# Patient Record
Sex: FEMALE | Race: WHITE | Employment: UNEMPLOYED | URBAN - METROPOLITAN AREA
[De-identification: names, ages, dates, MRNs, and addresses within clinical notes are randomized per-mention and may not be internally consistent; named-entity substitution may affect disease eponyms.]

---

## 2018-01-17 ENCOUNTER — OFFICE VISIT (OUTPATIENT)
Dept: OBGYN CLINIC | Facility: CLINIC | Age: 38
End: 2018-01-17

## 2018-01-17 VITALS
BODY MASS INDEX: 37.32 KG/M2 | DIASTOLIC BLOOD PRESSURE: 76 MMHG | HEIGHT: 61 IN | WEIGHT: 197.69 LBS | SYSTOLIC BLOOD PRESSURE: 130 MMHG

## 2018-01-17 DIAGNOSIS — Z01.419 WOMEN'S ANNUAL ROUTINE GYNECOLOGICAL EXAMINATION: Primary | ICD-10-CM

## 2018-01-17 DIAGNOSIS — N64.4 MASTODYNIA: ICD-10-CM

## 2018-01-17 PROCEDURE — 87624 HPV HI-RISK TYP POOLED RSLT: CPT | Performed by: OBSTETRICS & GYNECOLOGY

## 2018-01-17 PROCEDURE — 99385 PREV VISIT NEW AGE 18-39: CPT | Performed by: OBSTETRICS & GYNECOLOGY

## 2018-01-17 PROCEDURE — 88175 CYTOPATH C/V AUTO FLUID REDO: CPT | Performed by: OBSTETRICS & GYNECOLOGY

## 2018-01-17 RX ORDER — NORETHINDRONE ACETATE AND ETHINYL ESTRADIOL AND FERROUS FUMARATE 1.5-30(21)
KIT ORAL
COMMUNITY
Start: 2017-12-14 | End: 2018-01-17

## 2018-01-17 RX ORDER — CLOBETASOL PROPIONATE 0.5 MG/G
OINTMENT TOPICAL AS NEEDED
COMMUNITY
End: 2019-02-14 | Stop reason: ALTCHOICE

## 2018-01-17 RX ORDER — NORETHINDRONE ACETATE AND ETHINYL ESTRADIOL AND FERROUS FUMARATE 1.5-30(21)
1 KIT ORAL DAILY
Qty: 90 TABLET | Refills: 3 | Status: SHIPPED | OUTPATIENT
Start: 2018-01-17 | End: 2018-12-27

## 2018-01-17 NOTE — PROGRESS NOTES
GYN ANNUAL    1/17/2018  8:51 AM    CC:Patient presents to establish care with concerns about resolved right breast mass 1/2018    HPI: Patient is a 40year old [id number]  here for annual gyn exam and evaluation of right breast. Reports onset two weeks ago o • Prostate Cancer Paternal Grandfather    • Heart Disorder Paternal Grandfather    • Prostate Cancer Paternal Uncle        Social History  Social History   Marital status:   Spouse name: N/A    Years of education: N/A  Number of children: N/A masses  Cul-de-sac: normal  R/V: normal perineum, no hemorrhoids  EXTREMITIES: nontender without edema        A/P: Patient is 40year old female here for well-woman exam.     1. Women's annual routine gynecological examination    2. Mastodynia          1.

## 2018-01-18 LAB — HPV I/H RISK 1 DNA SPEC QL NAA+PROBE: NEGATIVE

## 2018-01-19 ENCOUNTER — HOSPITAL ENCOUNTER (OUTPATIENT)
Dept: MAMMOGRAPHY | Facility: HOSPITAL | Age: 38
Discharge: HOME | End: 2018-01-19
Attending: OBSTETRICS & GYNECOLOGY
Payer: COMMERCIAL

## 2018-01-19 ENCOUNTER — HOSPITAL ENCOUNTER (OUTPATIENT)
Dept: ULTRASOUND IMAGING | Facility: HOSPITAL | Age: 38
Discharge: HOME | End: 2018-01-19
Attending: OBSTETRICS & GYNECOLOGY
Payer: COMMERCIAL

## 2018-01-19 DIAGNOSIS — Z01.419 WOMEN'S ANNUAL ROUTINE GYNECOLOGICAL EXAMINATION: ICD-10-CM

## 2018-01-19 DIAGNOSIS — N64.4 MASTODYNIA: ICD-10-CM

## 2018-01-19 PROCEDURE — 77062 BREAST TOMOSYNTHESIS BI: CPT | Performed by: OBSTETRICS & GYNECOLOGY

## 2018-01-19 PROCEDURE — 76642 ULTRASOUND BREAST LIMITED: CPT | Performed by: OBSTETRICS & GYNECOLOGY

## 2018-01-19 PROCEDURE — 77066 DX MAMMO INCL CAD BI: CPT | Performed by: OBSTETRICS & GYNECOLOGY

## 2018-03-23 ENCOUNTER — HOSPITAL ENCOUNTER (OUTPATIENT)
Age: 38
Discharge: HOME | End: 2018-03-23
Attending: EMERGENCY MEDICINE
Payer: COMMERCIAL

## 2018-03-23 VITALS
TEMPERATURE: 99 F | DIASTOLIC BLOOD PRESSURE: 72 MMHG | SYSTOLIC BLOOD PRESSURE: 136 MMHG | RESPIRATION RATE: 18 BRPM | OXYGEN SATURATION: 100 % | HEART RATE: 88 BPM

## 2018-03-23 DIAGNOSIS — N30.01 ACUTE CYSTITIS WITH HEMATURIA: Primary | ICD-10-CM

## 2018-03-23 LAB
B-HCG UR QL: NEGATIVE
URINE BILIRUBIN: NEGATIVE
URINE CLARITY: CLEAR
URINE COLOR: YELLOW
URINE GLUCOSE: NEGATIVE MG/DL
URINE NITRITE: NEGATIVE
URINE PH: 6
URINE SPECIFIC GRAVITY: 1.02
URINE UROBILINOGEN: 0.2 MG/DL

## 2018-03-23 PROCEDURE — 87077 CULTURE AEROBIC IDENTIFY: CPT | Performed by: EMERGENCY MEDICINE

## 2018-03-23 PROCEDURE — 99203 OFFICE O/P NEW LOW 30 MIN: CPT

## 2018-03-23 PROCEDURE — 87086 URINE CULTURE/COLONY COUNT: CPT | Performed by: EMERGENCY MEDICINE

## 2018-03-23 PROCEDURE — 99214 OFFICE O/P EST MOD 30 MIN: CPT

## 2018-03-23 PROCEDURE — 81025 URINE PREGNANCY TEST: CPT

## 2018-03-23 PROCEDURE — 87186 SC STD MICRODIL/AGAR DIL: CPT | Performed by: EMERGENCY MEDICINE

## 2018-03-23 PROCEDURE — 81002 URINALYSIS NONAUTO W/O SCOPE: CPT

## 2018-03-23 RX ORDER — SULFAMETHOXAZOLE AND TRIMETHOPRIM 800; 160 MG/1; MG/1
1 TABLET ORAL 2 TIMES DAILY
Qty: 6 TABLET | Refills: 0 | Status: SHIPPED | OUTPATIENT
Start: 2018-03-23 | End: 2018-03-26

## 2018-03-23 NOTE — ED INITIAL ASSESSMENT (HPI)
Patient comes in with urinary symptoms, urgency, burning for the past two days. Denies blood in her urine. Denies back pain.

## 2018-03-23 NOTE — ED PROVIDER NOTES
Patient Seen in: [address]    History   Patient presents with:  Urinary Symptoms (urologic)    Stated Complaint: UTI     HPI    Patient is a 60-year-old female with a history of  ×2 presents with complaints of nondistended  Back: No CVA tenderness  Skin: No rash, good turgor      ED Course     Labs Reviewed   [facility] POCT URINALYSIS DIPSTICK MANUAL       ED Course as of Mar 23 1905  ------------------------------------------------------------       [facility]     Patient h

## 2018-04-22 PROBLEM — J98.8 CONGESTION OF UPPER AIRWAY: Status: ACTIVE | Noted: 2018-04-16

## 2018-04-23 ENCOUNTER — OFFICE VISIT (OUTPATIENT)
Dept: FAMILY MEDICINE CLINIC | Facility: CLINIC | Age: 38
End: 2018-04-23

## 2018-04-23 ENCOUNTER — APPOINTMENT (OUTPATIENT)
Dept: LAB | Facility: REFERENCE LAB | Age: 38
End: 2018-04-23
Attending: FAMILY MEDICINE
Payer: COMMERCIAL

## 2018-04-23 VITALS
DIASTOLIC BLOOD PRESSURE: 70 MMHG | BODY MASS INDEX: 36.44 KG/M2 | SYSTOLIC BLOOD PRESSURE: 126 MMHG | OXYGEN SATURATION: 99 % | HEART RATE: 62 BPM | HEIGHT: 61 IN | WEIGHT: 193 LBS

## 2018-04-23 DIAGNOSIS — E66.9 OBESITY (BMI 35.0-39.9 WITHOUT COMORBIDITY): ICD-10-CM

## 2018-04-23 DIAGNOSIS — Z00.00 ENCOUNTER FOR ROUTINE ADULT HEALTH EXAMINATION WITHOUT ABNORMAL FINDINGS: ICD-10-CM

## 2018-04-23 DIAGNOSIS — Z00.00 ENCOUNTER FOR ROUTINE ADULT HEALTH EXAMINATION WITHOUT ABNORMAL FINDINGS: Primary | ICD-10-CM

## 2018-04-23 PROCEDURE — 99385 PREV VISIT NEW AGE 18-39: CPT | Performed by: FAMILY MEDICINE

## 2018-04-23 PROCEDURE — 80061 LIPID PANEL: CPT | Performed by: FAMILY MEDICINE

## 2018-04-23 PROCEDURE — 36415 COLL VENOUS BLD VENIPUNCTURE: CPT | Performed by: FAMILY MEDICINE

## 2018-04-23 PROCEDURE — 83036 HEMOGLOBIN GLYCOSYLATED A1C: CPT | Performed by: FAMILY MEDICINE

## 2018-04-23 RX ORDER — PHENTERMINE HYDROCHLORIDE 15 MG/1
15 CAPSULE ORAL EVERY MORNING
Qty: 30 CAPSULE | Refills: 0 | Status: SHIPPED | OUTPATIENT
Start: 2018-04-23 | End: 2018-05-21

## 2018-04-23 NOTE — PROGRESS NOTES
HPI:   Ernestine Crespo is a 40year old female who presents for a complete physical exam.     Last pap: 1/2018 and normal-repeat 3 years   Last mammogram: 1/2018-6 month diagnostic left breast mammogram and ultrasound recommended    Menses: Regular, month of cervix     in early        Past Surgical History:  05/10/2013:   2015:   2007: D & C      Comment: NOLVIA   2012: FOOT SURGERY Bilateral      Comment: x 2   No date: FAYE      Comment: in early  for abnormal pap   No date: Alexx Mccormick is a 40year old female who presents for a complete physical exam.  Encounter for routine adult health examination without abnormal findings  (primary encounter diagnosis)  Obesity (bmi 35.0-39.9 without comorbidity)    Orders Placed This Encounter Imaging & Consults:  None    Gosia Donovan DO  4/22/2018  7:58 PM

## 2018-04-27 ENCOUNTER — PATIENT MESSAGE (OUTPATIENT)
Dept: FAMILY MEDICINE CLINIC | Facility: CLINIC | Age: 38
End: 2018-04-27

## 2018-04-27 NOTE — TELEPHONE ENCOUNTER
From: Khoi Moody  To: Felix Lopez DO  Sent: 4/27/2018 2:56 PM CDT  Subject: Prescription Question    Seems I'm having random itching with small hives just in the itchy areas. Random shoulder, then later in the day, thigh, then ankle.  I think this m

## 2018-05-21 RX ORDER — TOPIRAMATE 25 MG/1
25 TABLET ORAL NIGHTLY
Qty: 30 TABLET | Refills: 2 | Status: SHIPPED | OUTPATIENT
Start: 2018-05-21 | End: 2018-08-19

## 2018-07-30 ENCOUNTER — TELEPHONE (OUTPATIENT)
Dept: OBGYN CLINIC | Facility: CLINIC | Age: 38
End: 2018-07-30

## 2018-07-30 DIAGNOSIS — N63.0 LUMP OR MASS IN BREAST: Primary | ICD-10-CM

## 2018-07-30 NOTE — TELEPHONE ENCOUNTER
Pt inquiring about f/u mammogram. Per mammo done 1/19/18, pt is due for 6 month f/u diagnostic mammo of left breast and US as well. Orders placed and let pt know.

## 2018-08-06 ENCOUNTER — TELEPHONE (OUTPATIENT)
Dept: OBGYN CLINIC | Facility: CLINIC | Age: 38
End: 2018-08-06

## 2018-08-06 ENCOUNTER — HOSPITAL ENCOUNTER (OUTPATIENT)
Dept: MAMMOGRAPHY | Facility: HOSPITAL | Age: 38
Discharge: HOME | End: 2018-08-06
Attending: OBSTETRICS & GYNECOLOGY
Payer: COMMERCIAL

## 2018-08-06 ENCOUNTER — HOSPITAL ENCOUNTER (OUTPATIENT)
Dept: ULTRASOUND IMAGING | Facility: HOSPITAL | Age: 38
Discharge: HOME | End: 2018-08-06
Attending: OBSTETRICS & GYNECOLOGY
Payer: COMMERCIAL

## 2018-08-06 DIAGNOSIS — N64.4 BREAST PAIN: ICD-10-CM

## 2018-08-06 DIAGNOSIS — N64.4 BREAST PAIN: Primary | ICD-10-CM

## 2018-08-06 DIAGNOSIS — N63.0 LUMP OR MASS IN BREAST: ICD-10-CM

## 2018-08-06 PROCEDURE — 76642 ULTRASOUND BREAST LIMITED: CPT | Performed by: OBSTETRICS & GYNECOLOGY

## 2018-08-06 PROCEDURE — 77062 BREAST TOMOSYNTHESIS BI: CPT | Performed by: OBSTETRICS & GYNECOLOGY

## 2018-08-06 PROCEDURE — 77066 DX MAMMO INCL CAD BI: CPT | Performed by: OBSTETRICS & GYNECOLOGY

## 2018-08-06 NOTE — TELEPHONE ENCOUNTER
Radiology calling requesting a bilateral diagnostic mammogram with usn. Pt c/o bilateral nipple discharge.  Pt in office now

## 2018-08-06 NOTE — TELEPHONE ENCOUNTER
Msg rec'd that pt needs an order for bilat diag mammo with US d/t breast pain. Order placed, radiology notified.

## 2018-08-10 DIAGNOSIS — N63.20 LEFT BREAST MASS: Primary | ICD-10-CM

## 2018-10-08 RX ORDER — NORETHINDRONE ACETATE AND ETHINYL ESTRADIOL 1.5; 3 MG/1; UG/1
TABLET ORAL
Qty: 21 TABLET | Refills: 0 | Status: SHIPPED | OUTPATIENT
Start: 2018-10-08 | End: 2018-12-27

## 2018-12-27 ENCOUNTER — TELEPHONE (OUTPATIENT)
Dept: OBGYN CLINIC | Facility: CLINIC | Age: 38
End: 2018-12-27

## 2018-12-27 RX ORDER — NORETHINDRONE ACETATE AND ETHINYL ESTRADIOL AND FERROUS FUMARATE 1.5-30(21)
1 KIT ORAL DAILY
Qty: 90 TABLET | Refills: 0 | Status: SHIPPED | OUTPATIENT
Start: 2018-12-27 | End: 2019-02-14

## 2018-12-27 NOTE — TELEPHONE ENCOUNTER
Spoke with pt aware needs Diagnostic left mammogram. Pt expressed understanding and will call to schedule appt. Pt also states she nneds refill on birth control, last appointment 01/2018. Annual appt made for 02/14/2019. 2 refills sent to pt's pharmacy.

## 2019-02-14 ENCOUNTER — OFFICE VISIT (OUTPATIENT)
Dept: OBGYN CLINIC | Facility: CLINIC | Age: 39
End: 2019-02-14
Payer: COMMERCIAL

## 2019-02-14 VITALS
SYSTOLIC BLOOD PRESSURE: 130 MMHG | WEIGHT: 196.31 LBS | HEIGHT: 61 IN | DIASTOLIC BLOOD PRESSURE: 86 MMHG | BODY MASS INDEX: 37.06 KG/M2

## 2019-02-14 DIAGNOSIS — N76.3 CHRONIC VULVITIS: ICD-10-CM

## 2019-02-14 DIAGNOSIS — Z01.419 WOMEN'S ANNUAL ROUTINE GYNECOLOGICAL EXAMINATION: Primary | ICD-10-CM

## 2019-02-14 DIAGNOSIS — Z30.41 ENCOUNTER FOR SURVEILLANCE OF CONTRACEPTIVE PILLS: ICD-10-CM

## 2019-02-14 PROCEDURE — 99395 PREV VISIT EST AGE 18-39: CPT | Performed by: OBSTETRICS & GYNECOLOGY

## 2019-02-14 RX ORDER — NORETHINDRONE ACETATE AND ETHINYL ESTRADIOL AND FERROUS FUMARATE 1.5-30(21)
1 KIT ORAL DAILY
Qty: 90 TABLET | Refills: 3 | Status: SHIPPED | OUTPATIENT
Start: 2019-02-14 | End: 2020-02-12

## 2019-02-14 NOTE — PROGRESS NOTES
GYN ANNUAL    2/14/2019  10:35 AM    CC:Patient presents for annual exam    HPI: Patient is a 41year old [id number] LMP 2/5/2019 here for annual gyn exam with multiple concerns.  Reports excessive sweating this summer that has resolved along with persistent v Mother    • Cancer Maternal Grandmother         stomach cancer   • No Known Problems Maternal Grandfather    • Heart Disorder Paternal Grandmother    • Dementia Paternal Grandmother    • Prostate Cancer Paternal Grandfather    • Heart Disorder Paternal Gra leukoplakia perirectally  Urethra: meatus normal  Bladder: well supported  Vagina: normal mucosa, no lesions or discharge   Uterus: normal size, mobile, nontender  Cervix: normal os, no lesions or bleeding  Adnexa: normal size, bilaterally nontender, no pa

## 2019-03-12 RX ORDER — PHENTERMINE HYDROCHLORIDE 15 MG/1
15 CAPSULE ORAL DAILY
COMMUNITY
Start: 2019-03-07 | End: 2019-04-03

## 2019-03-14 ENCOUNTER — HOSPITAL ENCOUNTER (OUTPATIENT)
Dept: MAMMOGRAPHY | Facility: HOSPITAL | Age: 39
Discharge: HOME | End: 2019-03-14
Attending: OBSTETRICS & GYNECOLOGY
Payer: COMMERCIAL

## 2019-03-14 ENCOUNTER — HOSPITAL ENCOUNTER (OUTPATIENT)
Dept: ULTRASOUND IMAGING | Facility: HOSPITAL | Age: 39
Discharge: HOME | End: 2019-03-14
Attending: OBSTETRICS & GYNECOLOGY
Payer: COMMERCIAL

## 2019-03-14 DIAGNOSIS — N63.0 LUMP OR MASS IN BREAST: ICD-10-CM

## 2019-03-14 PROCEDURE — 77065 DX MAMMO INCL CAD UNI: CPT | Performed by: OBSTETRICS & GYNECOLOGY

## 2019-03-14 PROCEDURE — 76642 ULTRASOUND BREAST LIMITED: CPT | Performed by: OBSTETRICS & GYNECOLOGY

## 2019-03-14 PROCEDURE — 77061 BREAST TOMOSYNTHESIS UNI: CPT | Performed by: OBSTETRICS & GYNECOLOGY

## 2019-03-17 ENCOUNTER — OFFICE VISIT (OUTPATIENT)
Dept: URGENT CARE | Age: 39
End: 2019-03-17

## 2019-03-17 VITALS
DIASTOLIC BLOOD PRESSURE: 90 MMHG | SYSTOLIC BLOOD PRESSURE: 150 MMHG | RESPIRATION RATE: 16 BRPM | OXYGEN SATURATION: 99 % | HEART RATE: 103 BPM | TEMPERATURE: 98.7 F

## 2019-03-17 DIAGNOSIS — H66.001 ACUTE SUPPURATIVE OTITIS MEDIA OF RIGHT EAR WITHOUT SPONTANEOUS RUPTURE OF TYMPANIC MEMBRANE, RECURRENCE NOT SPECIFIED: Primary | ICD-10-CM

## 2019-03-17 PROCEDURE — 99202 OFFICE O/P NEW SF 15 MIN: CPT | Performed by: FAMILY MEDICINE

## 2019-03-17 RX ORDER — PHENTERMINE HYDROCHLORIDE 15 MG/1
15 CAPSULE ORAL EVERY MORNING
COMMUNITY

## 2019-03-17 RX ORDER — AMOXICILLIN AND CLAVULANATE POTASSIUM 875; 125 MG/1; MG/1
1 TABLET, FILM COATED ORAL EVERY 12 HOURS
Qty: 20 TABLET | Refills: 0 | Status: SHIPPED | OUTPATIENT
Start: 2019-03-17 | End: 2019-03-27

## 2019-03-17 RX ORDER — NEOMYCIN SULFATE, POLYMYXIN B SULFATE, HYDROCORTISONE 3.5; 10000; 1 MG/ML; [USP'U]/ML; MG/ML
SOLUTION/ DROPS AURICULAR (OTIC)
Qty: 10 ML | Refills: 0 | Status: SHIPPED | OUTPATIENT
Start: 2019-03-17 | End: 2019-03-24

## 2019-04-03 ENCOUNTER — APPOINTMENT (OUTPATIENT)
Dept: LAB | Age: 39
End: 2019-04-03
Attending: FAMILY MEDICINE
Payer: COMMERCIAL

## 2019-04-03 ENCOUNTER — OFFICE VISIT (OUTPATIENT)
Dept: FAMILY MEDICINE CLINIC | Facility: CLINIC | Age: 39
End: 2019-04-03
Payer: COMMERCIAL

## 2019-04-03 VITALS
HEIGHT: 61 IN | DIASTOLIC BLOOD PRESSURE: 72 MMHG | WEIGHT: 195 LBS | BODY MASS INDEX: 36.82 KG/M2 | OXYGEN SATURATION: 98 % | SYSTOLIC BLOOD PRESSURE: 120 MMHG | HEART RATE: 73 BPM

## 2019-04-03 DIAGNOSIS — Z51.81 ENCOUNTER FOR MEDICATION MONITORING: ICD-10-CM

## 2019-04-03 PROCEDURE — 93010 ELECTROCARDIOGRAM REPORT: CPT | Performed by: FAMILY MEDICINE

## 2019-04-03 PROCEDURE — 99213 OFFICE O/P EST LOW 20 MIN: CPT | Performed by: FAMILY MEDICINE

## 2019-04-03 PROCEDURE — 93005 ELECTROCARDIOGRAM TRACING: CPT

## 2019-04-03 RX ORDER — PHENTERMINE HYDROCHLORIDE 30 MG/1
30 CAPSULE ORAL DAILY
Qty: 30 CAPSULE | Refills: 1 | Status: SHIPPED | OUTPATIENT
Start: 2019-04-03 | End: 2019-05-14 | Stop reason: DRUGHIGH

## 2019-04-03 NOTE — PROGRESS NOTES
CC:  Patient presents with:  Medication Follow-Up: regarding phentermine, patient states it was helping with cravings the first 2 weeks she started then the past 2 weeks she feels like its not helping as much      HPI: 41year old female here for follow-up control/protection: OCP    Lifestyle      Physical activity:        Days per week: Not on file        Minutes per session: Not on file      Stress: Not on file    Relationships      Social connections:        Talks on phone: Not on file        Pat vasquez S2  Pulmonary:  Clear bilaterally, good air entry  Dermatologic:  No rashes or lesions    Assessment and Plan: 41year old female here to follow-up after starting Phentermine.      1. BMI 36.0-36.9,adult    - Did note improvement initially with Phentermine

## 2019-04-05 ENCOUNTER — TELEPHONE (OUTPATIENT)
Dept: FAMILY MEDICINE CLINIC | Facility: CLINIC | Age: 39
End: 2019-04-05

## 2019-04-05 NOTE — TELEPHONE ENCOUNTER
Lisa is telling patient she needs a prior auth for  Phentermine HCl 30 MG Oral Cap  She said Lisa told her they sent us this information  Please contact Lisa in [phone]

## 2019-04-10 NOTE — TELEPHONE ENCOUNTER
Pt called upset that she \"keeps getting different messages from different people\". Pt is waiting for her prior authorization for a medication she requested on 4/5.   This RN checked in with MG who was log in on to cover my meds to check on the status of t

## 2019-04-10 NOTE — TELEPHONE ENCOUNTER
Prior authorization has been processed will await insurance response and notify patient once we receive a response

## 2019-04-11 NOTE — TELEPHONE ENCOUNTER
Called patient and informed her that we received a letter that the insurance is not able to verify her insurance. Patient confirmed that we do have the right insurance information.  Called [employer] and they also can not find the patient's member ID or patient i

## 2019-04-12 NOTE — TELEPHONE ENCOUNTER
Spoke to patient and informed her that I was able to get approval for her prior authorization.  Patient verbalized understanding

## 2019-04-18 ENCOUNTER — OFFICE VISIT (OUTPATIENT)
Dept: FAMILY MEDICINE CLINIC | Facility: CLINIC | Age: 39
End: 2019-04-18
Payer: COMMERCIAL

## 2019-04-18 VITALS — DIASTOLIC BLOOD PRESSURE: 83 MMHG | SYSTOLIC BLOOD PRESSURE: 138 MMHG | TEMPERATURE: 99 F

## 2019-04-18 DIAGNOSIS — H92.02 OTALGIA OF LEFT EAR: ICD-10-CM

## 2019-04-18 DIAGNOSIS — H93.8X2 IRRITATION OF LEFT EAR: Primary | ICD-10-CM

## 2019-04-18 PROCEDURE — 99202 OFFICE O/P NEW SF 15 MIN: CPT | Performed by: NURSE PRACTITIONER

## 2019-04-18 NOTE — PROGRESS NOTES
CHIEF COMPLAINT:   Patient presents with:  Ear Pain      HPI:   Eber Amezquita is a 41year old female who presents to clinic today with complaints of LEFT ear pain. Left ear discomfort to the external ear, starting 2-3 days ago.  It is throbbing, dry s this area. no otorrhea noted, no edema. TMs intact bilaterally with clear effusions. Right EAC clear without sign of infection.    NOSE: nostrils patent, minimal nasal mucus, nasal mucosa boggy  THROAT: oral mucosa pink, moist. Posterior pharynx non erythem

## 2019-05-13 ENCOUNTER — PATIENT MESSAGE (OUTPATIENT)
Dept: FAMILY MEDICINE CLINIC | Facility: CLINIC | Age: 39
End: 2019-05-13

## 2019-05-14 RX ORDER — PHENTERMINE HYDROCHLORIDE 37.5 MG/1
37.5 CAPSULE ORAL EVERY MORNING
Qty: 30 CAPSULE | Refills: 1 | OUTPATIENT
Start: 2019-05-14 | End: 2019-07-09

## 2019-05-14 NOTE — TELEPHONE ENCOUNTER
From: Fidel Ingram  To: Ainsley Fragoso DO  Sent: 5/13/2019 12:08 AM CDT  Subject: Prescription Question    Hi Dr. Fartun Costa,    I wanted to touch base re: my meds. While I am now comfortable and not feeling fluttery.  However, I also don't feel like the medic

## 2019-05-14 NOTE — PROGRESS NOTES
New RX called in to pharmacy      Linda Florentino, DO  You 13 minutes ago (10:28 AM)      Yes, please try to call in Phentermine 37.5 mg daily with one refill if possible. If not I can print an Rx. Hopefully no prior auth will be needed.

## 2019-07-08 ENCOUNTER — PATIENT MESSAGE (OUTPATIENT)
Dept: FAMILY MEDICINE CLINIC | Facility: CLINIC | Age: 39
End: 2019-07-08

## 2019-07-09 RX ORDER — PHENTERMINE HYDROCHLORIDE 37.5 MG/1
37.5 CAPSULE ORAL EVERY MORNING
Qty: 10 CAPSULE | Refills: 0 | Status: SHIPPED
Start: 2019-07-09 | End: 2019-07-16

## 2019-07-09 NOTE — TELEPHONE ENCOUNTER
From: Fidel Ingram  To: Ainsley Fragoso DO  Sent: 7/8/2019 11:12 PM CDT  Subject: Prescription Rani Clifford Costa,    I don't recall if I am able to get any more refills on phentermine. I will be out in 1 week.  I have been successful with my weig

## 2019-07-09 NOTE — PROGRESS NOTES
Refill Request:    Phentermine HCl 37.5 MG Oral Cap 30 capsule 1 5/14/2019    Sig:   Take 1 capsule (37.5 mg total) by mouth every morning.      Route:   Oral       LOV: 4/3/2019  Last refill 5/14/2019

## 2019-07-16 RX ORDER — PHENTERMINE HYDROCHLORIDE 37.5 MG/1
37.5 CAPSULE ORAL EVERY MORNING
Qty: 30 CAPSULE | Refills: 1 | Status: SHIPPED | OUTPATIENT
Start: 2019-07-16 | End: 2019-08-13

## 2019-07-16 NOTE — PROGRESS NOTES
Patient arrived in the office requesting script and asking for more than 10 tablets as she received that from the covering provider while I was away. Script printed for 30 and advised her to return in 1 month for follow-up on how it is going.

## 2019-08-05 ENCOUNTER — OFFICE VISIT (OUTPATIENT)
Dept: FAMILY MEDICINE CLINIC | Facility: CLINIC | Age: 39
End: 2019-08-05
Payer: COMMERCIAL

## 2019-08-05 VITALS
HEART RATE: 80 BPM | TEMPERATURE: 98 F | OXYGEN SATURATION: 99 % | HEIGHT: 61 IN | RESPIRATION RATE: 16 BRPM | SYSTOLIC BLOOD PRESSURE: 130 MMHG | BODY MASS INDEX: 33.99 KG/M2 | DIASTOLIC BLOOD PRESSURE: 78 MMHG | WEIGHT: 180 LBS

## 2019-08-05 DIAGNOSIS — R39.15 URGENCY OF URINATION: Primary | ICD-10-CM

## 2019-08-05 LAB
BILIRUBIN: NEGATIVE
GLUCOSE (URINE DIPSTICK): NEGATIVE MG/DL
KETONES (URINE DIPSTICK): NEGATIVE MG/DL
MULTISTIX LOT#: ABNORMAL NUMERIC

## 2019-08-05 PROCEDURE — 99213 OFFICE O/P EST LOW 20 MIN: CPT | Performed by: NURSE PRACTITIONER

## 2019-08-05 PROCEDURE — 81003 URINALYSIS AUTO W/O SCOPE: CPT | Performed by: NURSE PRACTITIONER

## 2019-08-05 RX ORDER — NITROFURANTOIN 25; 75 MG/1; MG/1
100 CAPSULE ORAL 2 TIMES DAILY
Qty: 10 CAPSULE | Refills: 0 | Status: SHIPPED | OUTPATIENT
Start: 2019-08-05 | End: 2019-08-10

## 2019-08-06 NOTE — PROGRESS NOTES
CHIEF COMPLAINT:   Patient presents with:  Urinary Symptoms (urologic): urgency of urination for 2 days. HPI:   Elisa Sportsman is a 41year old female who presents with symptoms of UTI. Complaining of urinary  Urgency for last 2 days.  Symptoms have LUNGS: clear to ausculation bilaterally, no wheezing or rhonchi  GI: BS present x 4 and normoactive. No hepatosplenomegaly. : No suprapubic tenderness, No bladder distention or CVAT.     Recent Results (from the past 24 hour(s))   URINALYSIS, AUTO, W/O Risk and benefits of medication discussed. Stressed importance of completing full course of antibiotic. Pt verbalizes understanding. Patient Instructions     Bladder Infection, Female (Adult)    Urine is normally doesn't have any bacteria in it.  But brigida The most common cause of bladder infections is bacteria from the bowels. The bacteria get onto the skin around the opening of the urethra. From there, they can get into the urine and travel up to the bladder, causing inflammation and infection.  This usuall · Urinate more often. Don't try to hold urine in for a long time. · Wear loose-fitting clothes and cotton underwear. Avoid tight-fitting pants. · Improve your diet and prevent constipation.  Eat more fresh fruit and vegetables, and fiber, and less junk an

## 2019-08-13 ENCOUNTER — OFFICE VISIT (OUTPATIENT)
Dept: FAMILY MEDICINE CLINIC | Facility: CLINIC | Age: 39
End: 2019-08-13
Payer: COMMERCIAL

## 2019-08-13 VITALS
OXYGEN SATURATION: 98 % | SYSTOLIC BLOOD PRESSURE: 122 MMHG | WEIGHT: 184 LBS | DIASTOLIC BLOOD PRESSURE: 72 MMHG | HEART RATE: 78 BPM | BODY MASS INDEX: 34.74 KG/M2 | HEIGHT: 61 IN

## 2019-08-13 DIAGNOSIS — E66.9 OBESITY (BMI 30-39.9): Primary | ICD-10-CM

## 2019-08-13 PROCEDURE — 99214 OFFICE O/P EST MOD 30 MIN: CPT | Performed by: FAMILY MEDICINE

## 2019-08-13 RX ORDER — PHENTERMINE HYDROCHLORIDE 37.5 MG/1
37.5 TABLET ORAL
Qty: 30 TABLET | Refills: 2 | Status: SHIPPED | OUTPATIENT
Start: 2019-08-13 | End: 2019-10-17

## 2019-08-13 RX ORDER — PHENTERMINE HYDROCHLORIDE 37.5 MG/1
37.5 TABLET ORAL
Qty: 30 TABLET | Refills: 2 | Status: SHIPPED | OUTPATIENT
Start: 2019-08-13 | End: 2019-08-13

## 2019-08-13 RX ORDER — TOPIRAMATE 25 MG/1
TABLET ORAL
Qty: 70 TABLET | Refills: 0 | Status: SHIPPED | OUTPATIENT
Start: 2019-08-13 | End: 2019-09-04

## 2019-08-26 ENCOUNTER — TELEPHONE (OUTPATIENT)
Dept: FAMILY MEDICINE CLINIC | Facility: CLINIC | Age: 39
End: 2019-08-26

## 2019-08-26 NOTE — TELEPHONE ENCOUNTER
Pharmacy calling to confirm a prescription was written by AS for this pt for phentermine on 7/16/2019    Phentermine HCl 37.5 MG Oral Cap (Discontinued) 30 capsule 1 7/16/2019 8/13/2019   Sig:   Take 1 capsule (37.5 mg total) by mouth every morning.      Ro

## 2019-09-04 RX ORDER — TOPIRAMATE 25 MG/1
50 TABLET ORAL NIGHTLY
Qty: 60 TABLET | Refills: 0 | Status: SHIPPED | OUTPATIENT
Start: 2019-09-04 | End: 2019-10-01

## 2019-09-04 NOTE — TELEPHONE ENCOUNTER
A refill request was received for:  Requested Prescriptions     Pending Prescriptions Disp Refills   • TOPIRAMATE 25 MG Oral Tab [Pharmacy Med Name: TOPIRAMATE 25 MG TABLET] 50 tablet 1     Sig: TAKE 1 TABLET (25 MG TOTAL) BY MOUTH NIGHTLY FOR 10 DAYS, THE

## 2019-10-01 RX ORDER — TOPIRAMATE 25 MG/1
TABLET ORAL
Qty: 60 TABLET | Refills: 0 | Status: SHIPPED | OUTPATIENT
Start: 2019-10-01 | End: 2019-10-29

## 2019-10-01 NOTE — TELEPHONE ENCOUNTER
A refill request was received for:  Requested Prescriptions     Pending Prescriptions Disp Refills   • TOPIRAMATE 25 MG Oral Tab [Pharmacy Med Name: TOPIRAMATE 25 MG TABLET] 60 tablet 0     Sig: TAKE 2 TABLETS BY MOUTH NIGHTLY     Last refill date: 09/04/1

## 2019-10-09 ENCOUNTER — OFFICE VISIT (OUTPATIENT)
Dept: FAMILY MEDICINE CLINIC | Facility: CLINIC | Age: 39
End: 2019-10-09
Payer: COMMERCIAL

## 2019-10-09 VITALS
DIASTOLIC BLOOD PRESSURE: 82 MMHG | HEART RATE: 100 BPM | WEIGHT: 171 LBS | RESPIRATION RATE: 16 BRPM | TEMPERATURE: 98 F | OXYGEN SATURATION: 98 % | BODY MASS INDEX: 32 KG/M2 | SYSTOLIC BLOOD PRESSURE: 140 MMHG

## 2019-10-09 DIAGNOSIS — R03.0 ELEVATED BLOOD PRESSURE READING: ICD-10-CM

## 2019-10-09 DIAGNOSIS — H60.391 OTITIS, EXTERNA, INFECTIVE, RIGHT: Primary | ICD-10-CM

## 2019-10-09 DIAGNOSIS — H65.01 NON-RECURRENT ACUTE SEROUS OTITIS MEDIA OF RIGHT EAR: ICD-10-CM

## 2019-10-09 PROCEDURE — 99213 OFFICE O/P EST LOW 20 MIN: CPT | Performed by: PHYSICIAN ASSISTANT

## 2019-10-09 RX ORDER — OFLOXACIN 3 MG/ML
10 SOLUTION AURICULAR (OTIC) DAILY
Qty: 1 BOTTLE | Refills: 0 | Status: SHIPPED | OUTPATIENT
Start: 2019-10-09 | End: 2019-10-16

## 2019-10-09 RX ORDER — AMOXICILLIN 875 MG/1
875 TABLET, COATED ORAL 2 TIMES DAILY
Qty: 20 TABLET | Refills: 0 | Status: SHIPPED | OUTPATIENT
Start: 2019-10-09 | End: 2019-10-19

## 2019-10-09 NOTE — PROGRESS NOTES
CHIEF COMPLAINT:   Patient presents with:  Ear Problem: 3 days, right ear pain mainly external but startign to notice a little inside ear, getting more ear infections within the last few months      HPI:   David Edwards is a 46year old female who prese Smoking status: Former Smoker      Smokeless tobacco: Never Used      Tobacco comment: quit in 2014    Alcohol use: Yes      Comment: occasional     Drug use: No        REVIEW OF SYSTEMS:   GENERAL: no fatigue  SKIN: no unusual skin lesions or rashes  EYE right ear  Elevated blood pressure reading      PLAN   Explained ear drops should be enough, but with hx and early appearing inner ear infection, instructed to start oral abx within 2 days if any inner ear pain develops, patient understands.   Discussed khalif

## 2019-10-17 RX ORDER — PHENTERMINE HYDROCHLORIDE 37.5 MG/1
CAPSULE ORAL
Qty: 30 CAPSULE | Refills: 0 | Status: SHIPPED | OUTPATIENT
Start: 2019-10-17 | End: 2019-11-21

## 2019-10-29 ENCOUNTER — TELEPHONE (OUTPATIENT)
Dept: OBGYN CLINIC | Facility: CLINIC | Age: 39
End: 2019-10-29

## 2019-10-29 RX ORDER — TOPIRAMATE 25 MG/1
TABLET ORAL
Qty: 180 TABLET | Refills: 0 | Status: SHIPPED | OUTPATIENT
Start: 2019-10-29 | End: 2019-11-05

## 2019-10-29 NOTE — TELEPHONE ENCOUNTER
PT looking for a refill for   TOPIRAMATE 25 MG Oral Tab 60 tablet     For coverage, Her insurance wants a three month refill    Please send to  [facility]/PHARMACY [address], [address]  [address] [phone], [phone]

## 2019-10-29 NOTE — TELEPHONE ENCOUNTER
LOV 8/13/19     Return in about 2 months (around 10/13/2019), or if symptoms worsen or fail to improve, for Physical/medication follow-up. Pt has appmnt scheduled with AS on 11/5/19.     Outpatient Medication Detail      Disp Refills Start End    [place]

## 2019-11-05 ENCOUNTER — OFFICE VISIT (OUTPATIENT)
Dept: FAMILY MEDICINE CLINIC | Facility: CLINIC | Age: 39
End: 2019-11-05
Payer: COMMERCIAL

## 2019-11-05 VITALS
DIASTOLIC BLOOD PRESSURE: 72 MMHG | BODY MASS INDEX: 32.85 KG/M2 | HEIGHT: 61 IN | WEIGHT: 174 LBS | HEART RATE: 90 BPM | OXYGEN SATURATION: 98 % | SYSTOLIC BLOOD PRESSURE: 114 MMHG

## 2019-11-05 DIAGNOSIS — L29.9 EAR ITCHING: ICD-10-CM

## 2019-11-05 DIAGNOSIS — G56.01 RIGHT CARPAL TUNNEL SYNDROME: ICD-10-CM

## 2019-11-05 DIAGNOSIS — Z23 NEED FOR VACCINATION: ICD-10-CM

## 2019-11-05 DIAGNOSIS — Z00.01 ENCOUNTER FOR ROUTINE ADULT HEALTH EXAMINATION WITH ABNORMAL FINDINGS: Primary | ICD-10-CM

## 2019-11-05 DIAGNOSIS — Z86.69 HISTORY OF FREQUENT EAR INFECTIONS: ICD-10-CM

## 2019-11-05 DIAGNOSIS — R92.8 ABNORMAL MAMMOGRAM: ICD-10-CM

## 2019-11-05 PROCEDURE — 99214 OFFICE O/P EST MOD 30 MIN: CPT | Performed by: FAMILY MEDICINE

## 2019-11-05 PROCEDURE — 99395 PREV VISIT EST AGE 18-39: CPT | Performed by: FAMILY MEDICINE

## 2019-11-05 PROCEDURE — 90471 IMMUNIZATION ADMIN: CPT | Performed by: FAMILY MEDICINE

## 2019-11-05 PROCEDURE — 90686 IIV4 VACC NO PRSV 0.5 ML IM: CPT | Performed by: FAMILY MEDICINE

## 2019-11-05 RX ORDER — TOPIRAMATE 25 MG/1
75 TABLET ORAL DAILY
Qty: 270 TABLET | Refills: 0 | Status: SHIPPED | OUTPATIENT
Start: 2019-11-05 | End: 2020-02-20

## 2019-11-05 NOTE — PATIENT INSTRUCTIONS
-Try triamcinolone in your external ears twice a day for no more than 2 weeks to see if it helps the itching and if not, will need to see dermatology  -Try wearing a carpal tunnel splint daily and if not improving in the next month, will need to see a spec Certain treatments help reduce the pressure on the median nerve and relieve symptoms. Choices for treatment may include one or more of the following:  · Wrist splint. This involves wearing a special brace on the wrist and hand.  The splint holds the wrist s Screening tests and vaccines are an important part of managing your health. A screening test is done to find possible disorders or diseases in people who don't have any symptoms.  The goal is to find a disease early so lifestyle changes can be made and you Type 2 diabetes All women with prediabetes Every year   Gonorrhea Sexually active women at increased risk for infection At routine exams   Hepatitis C Anyone at increased risk At routine exams   HIV All women should be tested at least once for HIV between Meningococcal Women at increased risk for infection should talk with their healthcare provider 1 or more doses   Pneumococcal conjugate vaccine (PCV13) and pneumococcal polysaccharide vaccine (PPSV23) Women at increased risk for infection should talk with

## 2019-11-05 NOTE — PROGRESS NOTES
HPI:   Vira Busby is a 46year old female who presents for a complete physical exam.   Patient presents with:  Physical: flu shot  Medication Follow-Up: phentermine and topiramate-feels like its helping    Does feel she is hitting a plateau a little carpal tunnel syndrome         Current Outpatient Medications   Medication Sig Dispense Refill   • triamcinolone acetonide 0.1 % External Ointment APPLY 1 APPLICATION TOPICALLY TWICE A WEEK.   2   • topiramate 25 MG Oral Tab Take 3 tablets (75 mg total) by BMI 32.88 kg/m²     GENERAL: well developed, well nourished, in no apparent distress   SKIN: left external pinna with areas of excoriation from scratching and dryness, no suspicious lesions  HEENT: right TM with scarring but no erythema or bulging, atrau scarring. Advise wearing a carpal tunnel brace while sleeping and during the day if possible to help with carpal tunnel syndrome symptoms, and if not improving, plan referral for possible injection.    Advised to schedule diagnostic mammogram follow-up as

## 2019-11-07 ENCOUNTER — LAB ENCOUNTER (OUTPATIENT)
Dept: LAB | Facility: REFERENCE LAB | Age: 39
End: 2019-11-07
Attending: FAMILY MEDICINE
Payer: COMMERCIAL

## 2019-11-07 DIAGNOSIS — Z00.01 ENCOUNTER FOR ROUTINE ADULT HEALTH EXAMINATION WITH ABNORMAL FINDINGS: ICD-10-CM

## 2019-11-07 PROCEDURE — 85025 COMPLETE CBC W/AUTO DIFF WBC: CPT | Performed by: FAMILY MEDICINE

## 2019-11-07 PROCEDURE — 80061 LIPID PANEL: CPT | Performed by: FAMILY MEDICINE

## 2019-11-07 PROCEDURE — 80053 COMPREHEN METABOLIC PANEL: CPT | Performed by: FAMILY MEDICINE

## 2019-11-07 PROCEDURE — 36415 COLL VENOUS BLD VENIPUNCTURE: CPT | Performed by: FAMILY MEDICINE

## 2019-11-07 PROCEDURE — 83036 HEMOGLOBIN GLYCOSYLATED A1C: CPT | Performed by: FAMILY MEDICINE

## 2019-11-19 ENCOUNTER — PATIENT MESSAGE (OUTPATIENT)
Dept: FAMILY MEDICINE CLINIC | Facility: CLINIC | Age: 39
End: 2019-11-19

## 2019-11-21 ENCOUNTER — TELEPHONE (OUTPATIENT)
Dept: FAMILY MEDICINE CLINIC | Facility: CLINIC | Age: 39
End: 2019-11-21

## 2019-11-21 RX ORDER — PHENTERMINE HYDROCHLORIDE 37.5 MG/1
CAPSULE ORAL
Qty: 30 CAPSULE | Refills: 2 | Status: SHIPPED | OUTPATIENT
Start: 2019-11-21 | End: 2020-01-15

## 2019-11-21 NOTE — TELEPHONE ENCOUNTER
Eden Arthur   to Saleem Mcrae DO            11/19/19 12:25 PM   Can you please send over a refill for the phentermine capsules to the [address] in [facility]? Their phone number is [phone].      Thank you,     Shellie MENDIETA 11/5/19

## 2019-11-21 NOTE — TELEPHONE ENCOUNTER
Please see telephone encounter dated 11/21/19. From: Fidel Ingram  To:  Magna Shara,   Sent: 11/19/2019 12:25 PM CST  Subject: Prescription Question    Can you please send over a refill for the phentermine capsules to the [facility] [place]

## 2020-01-15 ENCOUNTER — TELEPHONE (OUTPATIENT)
Dept: FAMILY MEDICINE CLINIC | Facility: CLINIC | Age: 40
End: 2020-01-15

## 2020-01-15 RX ORDER — PHENTERMINE HYDROCHLORIDE 37.5 MG/1
CAPSULE ORAL
Qty: 30 CAPSULE | Refills: 0 | Status: SHIPPED | OUTPATIENT
Start: 2020-01-15 | End: 2020-03-18

## 2020-01-15 NOTE — TELEPHONE ENCOUNTER
No upcoming appmnts with AS scheduled. LOV with AS on 11/5/19:    \"Does feel she is hitting a plateau a little bit with Phentermine and Topamax but feels her portion control is better and she is snacking less.  Has to take the Topamax in the morning

## 2020-01-16 NOTE — TELEPHONE ENCOUNTER
Refill of Phentermine sent but only 30 tablets as I would like her to follow-up prior to further refills.

## 2020-01-16 NOTE — TELEPHONE ENCOUNTER
Pt reluctantly scheduled appmnt for 2/20/2020. Pt also stated she did not call for refill, pt will be picking up last refill from previous fill but wanted to make sure she had another refill when she comes back from out of town.  Pt stated she does not get

## 2020-02-12 RX ORDER — NORETHINDRONE ACETATE AND ETHINYL ESTRADIOL AND FERROUS FUMARATE 1.5-30(21)
KIT ORAL
Qty: 84 TABLET | Refills: 0 | Status: SHIPPED | OUTPATIENT
Start: 2020-02-12 | End: 2020-05-04

## 2020-02-20 ENCOUNTER — OFFICE VISIT (OUTPATIENT)
Dept: FAMILY MEDICINE CLINIC | Facility: CLINIC | Age: 40
End: 2020-02-20
Payer: COMMERCIAL

## 2020-02-20 VITALS
WEIGHT: 164 LBS | SYSTOLIC BLOOD PRESSURE: 128 MMHG | HEIGHT: 61 IN | BODY MASS INDEX: 30.96 KG/M2 | OXYGEN SATURATION: 98 % | HEART RATE: 96 BPM | DIASTOLIC BLOOD PRESSURE: 72 MMHG

## 2020-02-20 DIAGNOSIS — J02.9 SORE THROAT: ICD-10-CM

## 2020-02-20 DIAGNOSIS — J06.9 VIRAL URI WITH COUGH: ICD-10-CM

## 2020-02-20 DIAGNOSIS — R92.8 ABNORMAL MAMMOGRAM: ICD-10-CM

## 2020-02-20 LAB
CONTROL LINE PRESENT WITH A CLEAR BACKGROUND (YES/NO): YES YES/NO
KIT EXPIRATION DATE: NORMAL DATE
STREP GRP A CUL-SCR: NEGATIVE

## 2020-02-20 PROCEDURE — 87880 STREP A ASSAY W/OPTIC: CPT | Performed by: FAMILY MEDICINE

## 2020-02-20 PROCEDURE — 99213 OFFICE O/P EST LOW 20 MIN: CPT | Performed by: FAMILY MEDICINE

## 2020-02-20 RX ORDER — TOPIRAMATE 25 MG/1
75 TABLET ORAL DAILY
Qty: 270 TABLET | Refills: 0 | Status: SHIPPED | OUTPATIENT
Start: 2020-02-20 | End: 2020-06-01

## 2020-02-20 NOTE — PROGRESS NOTES
CC:  Patient presents with:  Medication Follow-Up: phentermine and topiramate- no complaints  Cough: with some left ear discomfort for about 6 days      HPI: 46year old female here to follow-up on weight loss and with cough and left ear pain x 6 days.   Nila Not on file      Number of children: Not on file      Years of education: Not on file      Highest education level: Not on file    Occupational History      Not on file    Social Needs      Financial resource strain: Not on file      Food insecurity: (75 mg total) by mouth daily.  270 tablet 0   • MICROGESTIN FE 1.5/30 1.5-30 MG-MCG Oral Tab TAKE 1 TABLET BY MOUTH EVERY DAY 84 tablet 0   • Phentermine HCl 37.5 MG Oral Cap TAKE ONE CAPSULE BY MOUTH IN THE MORNING 30 capsule 0   • triamcinolone acetonide mammogram    - Due for follow-up diagnostic mammogram and order submitted   - [facility] DIAGNOSTIC BILATERAL (CPT=77066); Future    4.  Sore throat    - Likely viral, reviewed supportive care   - STREP A ASSAY W/OPTIC    Gosia Donovan DO  02/20/20  1:52 PM

## 2020-02-20 NOTE — PATIENT INSTRUCTIONS
-Can try Flonase nasal spray 1-2 sprays per nostril daily or Afrin nasal spray, but only use Afrin for a few days to prevent rebound congestion   -Try warm salt water gargles or one capful of hydrogen peroxide mixed with warm water and swish and spit for s

## 2020-03-18 RX ORDER — PHENTERMINE HYDROCHLORIDE 37.5 MG/1
CAPSULE ORAL
Qty: 30 CAPSULE | Refills: 2 | Status: SHIPPED | OUTPATIENT
Start: 2020-03-18 | End: 2020-06-15

## 2020-03-18 NOTE — TELEPHONE ENCOUNTER
A refill request was received for:  Requested Prescriptions     Pending Prescriptions Disp Refills   • Phentermine HCl 37.5 MG Oral Cap 30 capsule 0     Sig: TAKE ONE CAPSULE BY MOUTH IN THE MORNING     Last refill date:  01/15/19  Qty:30  Last office visi

## 2020-03-23 ENCOUNTER — TELEPHONE (OUTPATIENT)
Dept: FAMILY MEDICINE CLINIC | Facility: CLINIC | Age: 40
End: 2020-03-23

## 2020-03-23 PROCEDURE — 99441 PHONE E/M BY PHYS 5-10 MIN: CPT | Performed by: FAMILY MEDICINE

## 2020-03-23 RX ORDER — AMOXICILLIN 500 MG/1
500 CAPSULE ORAL 2 TIMES DAILY
Qty: 20 CAPSULE | Refills: 0 | Status: SHIPPED | OUTPATIENT
Start: 2020-03-23 | End: 2020-04-02

## 2020-03-23 NOTE — TELEPHONE ENCOUNTER
Virtual/Telephone Check-In    Elzal Mac verbally consents to a Virtual/Telephone Check-In service on 03/23/20. Patient understands and accepts financial responsibility for any deductible, co-insurance and/or co-pays associated with this service.

## 2020-05-04 RX ORDER — NORETHINDRONE ACETATE AND ETHINYL ESTRADIOL AND FERROUS FUMARATE 1.5-30(21)
KIT ORAL
Qty: 84 TABLET | Refills: 0 | Status: SHIPPED | OUTPATIENT
Start: 2020-05-04 | End: 2020-08-11

## 2020-06-01 RX ORDER — TOPIRAMATE 25 MG/1
TABLET ORAL
Qty: 270 TABLET | Refills: 1 | Status: SHIPPED | OUTPATIENT
Start: 2020-06-01 | End: 2020-12-22

## 2020-06-01 NOTE — TELEPHONE ENCOUNTER
A refill request was received for:  Requested Prescriptions     Pending Prescriptions Disp Refills   • TOPIRAMATE 25 MG Oral Tab [Pharmacy Med Name: TOPIRAMATE 25 MG TABLET] 270 tablet 0     Sig: TAKE 3 TABLETS BY MOUTH DAILY     Last refill date: 2/20/20

## 2020-06-15 RX ORDER — PHENTERMINE HYDROCHLORIDE 37.5 MG/1
CAPSULE ORAL
Qty: 30 CAPSULE | Refills: 0 | Status: SHIPPED | OUTPATIENT
Start: 2020-06-15 | End: 2020-08-26

## 2020-06-15 NOTE — TELEPHONE ENCOUNTER
A refill request was received for:  Requested Prescriptions     Pending Prescriptions Disp Refills   • PHENTERMINE HCL 37.5 MG Oral Cap [Pharmacy Med Name: Phentermine HCl Oral Capsule 37.5 MG] 30 capsule 0     Sig: TAKE ONE CAPSULE BY MOUTH IN THE MORNING

## 2020-07-27 RX ORDER — PHENTERMINE HYDROCHLORIDE 37.5 MG/1
CAPSULE ORAL
Qty: 30 CAPSULE | Refills: 0 | OUTPATIENT
Start: 2020-07-27

## 2020-07-27 RX ORDER — PHENTERMINE HYDROCHLORIDE 37.5 MG/1
37.5 CAPSULE ORAL EVERY MORNING
Qty: 30 CAPSULE | Refills: 0 | OUTPATIENT
Start: 2020-07-27

## 2020-07-30 ENCOUNTER — PATIENT MESSAGE (OUTPATIENT)
Dept: FAMILY MEDICINE CLINIC | Facility: CLINIC | Age: 40
End: 2020-07-30

## 2020-07-30 NOTE — TELEPHONE ENCOUNTER
From: Melanie Ayala  To: Pillo Chamberlain DO  Sent: 7/30/2020 1:23 AM CDT  Subject: Visit Jos Mejía,  I have been off the Phentermine for about 1.5 wks.  I am not sure if I was suppose to have a visit scheduled with you before my next

## 2020-08-10 ENCOUNTER — TELEPHONE (OUTPATIENT)
Dept: OBGYN CLINIC | Facility: CLINIC | Age: 40
End: 2020-08-10

## 2020-08-10 NOTE — TELEPHONE ENCOUNTER
Pt is needing refill     MICROGESTIN FE 1.5/30 1.5-30 MG-MCG Oral Tab    [facility]/PHARMACY [address], [address] [phone], [phone]        Pt states her ointment she has isn't helping also and wants to know if she can get something different she states that Dr Rj Keenan said there was a different cream available if this one didn't work   Please call back       triamcinolone acetonide 0.1 % External Ointment

## 2020-08-11 RX ORDER — NORETHINDRONE ACETATE AND ETHINYL ESTRADIOL AND FERROUS FUMARATE 1.5-30(21)
1 KIT ORAL DAILY
Qty: 84 TABLET | Refills: 0 | Status: SHIPPED | OUTPATIENT
Start: 2020-08-11 | End: 2020-08-26

## 2020-08-11 RX ORDER — NORETHINDRONE ACETATE AND ETHINYL ESTRADIOL AND FERROUS FUMARATE 1.5-30(21)
KIT ORAL
Qty: 84 TABLET | Refills: 0 | OUTPATIENT
Start: 2020-08-11

## 2020-08-11 RX ORDER — NORETHINDRONE ACETATE AND ETHINYL ESTRADIOL AND FERROUS FUMARATE 1.5-30(21)
1 KIT ORAL DAILY
Qty: 84 TABLET | Refills: 0 | OUTPATIENT
Start: 2020-08-11

## 2020-08-11 NOTE — TELEPHONE ENCOUNTER
Outpatient Medication Detail      Disp Refills Start End    MICROGESTIN FE 1.5/30 1.5-30 MG-MCG Oral Tab 84 tablet 0 8/11/2020     Sig - Route:  Take 1 tablet by mouth daily. - Oral    Sent to pharmacy as: Microgestin FE 1.5/30 1.5-30 MG-MCG Oral Tablet

## 2020-08-11 NOTE — TELEPHONE ENCOUNTER
LATA declining to re-start the phentermine and to come in for visit. Pt was very appreciate of f/u by this RN. This RN advised pt to call office and speak with one of the triage nurses if she does not hear back from office r/t med Rfs etc.  Microgestin Rf provided. Pt has upcoming appmnt with EB and with AS in October. All of pt's questions answered to pt's satisfaction. Pt verbalized understanding and agrees with POC. Kenia Livingston, DO  [facility] 10 Dr. Estephania Simon 20 hours ago (1:13 PM)      She can continue the topiramate, and I am happy to see her this week. Can add her on to my schedule if she is willing to come in and discuss the phentermine.      -LATA

## 2020-08-26 ENCOUNTER — OFFICE VISIT (OUTPATIENT)
Dept: OBGYN CLINIC | Facility: CLINIC | Age: 40
End: 2020-08-26
Payer: COMMERCIAL

## 2020-08-26 VITALS
HEIGHT: 61 IN | SYSTOLIC BLOOD PRESSURE: 122 MMHG | DIASTOLIC BLOOD PRESSURE: 70 MMHG | WEIGHT: 156.88 LBS | BODY MASS INDEX: 29.62 KG/M2

## 2020-08-26 DIAGNOSIS — N76.3 CHRONIC VULVITIS: ICD-10-CM

## 2020-08-26 DIAGNOSIS — Z12.4 SCREENING FOR MALIGNANT NEOPLASM OF THE CERVIX: ICD-10-CM

## 2020-08-26 DIAGNOSIS — Z01.419 WOMEN'S ANNUAL ROUTINE GYNECOLOGICAL EXAMINATION: Primary | ICD-10-CM

## 2020-08-26 DIAGNOSIS — Z30.41 ENCOUNTER FOR SURVEILLANCE OF CONTRACEPTIVE PILLS: ICD-10-CM

## 2020-08-26 PROCEDURE — 3008F BODY MASS INDEX DOCD: CPT | Performed by: OBSTETRICS & GYNECOLOGY

## 2020-08-26 PROCEDURE — 3078F DIAST BP <80 MM HG: CPT | Performed by: OBSTETRICS & GYNECOLOGY

## 2020-08-26 PROCEDURE — 99214 OFFICE O/P EST MOD 30 MIN: CPT | Performed by: OBSTETRICS & GYNECOLOGY

## 2020-08-26 PROCEDURE — 87624 HPV HI-RISK TYP POOLED RSLT: CPT | Performed by: OBSTETRICS & GYNECOLOGY

## 2020-08-26 PROCEDURE — 3074F SYST BP LT 130 MM HG: CPT | Performed by: OBSTETRICS & GYNECOLOGY

## 2020-08-26 PROCEDURE — 99395 PREV VISIT EST AGE 18-39: CPT | Performed by: OBSTETRICS & GYNECOLOGY

## 2020-08-26 RX ORDER — NORETHINDRONE ACETATE AND ETHINYL ESTRADIOL AND FERROUS FUMARATE 1.5-30(21)
1 KIT ORAL DAILY
Qty: 84 TABLET | Refills: 3 | Status: SHIPPED | OUTPATIENT
Start: 2020-08-26 | End: 2021-09-21

## 2020-08-26 NOTE — PROGRESS NOTES
GYN ANNUAL    8/26/2020  5:29 PM    Patient presents with: Annual: annual exam and pap smear   Refill Request: MICROGESTIN FE 1.5/30 1.5-30 MG-MCG Oral Tab  Vaginal Problem: pt c/o vaginal dry skin  .     HPI: Patient is a 45year old [id number] LMP 8/18/20 p Heart Disorder Mother    • Psoriasis Mother    • Cancer Maternal Grandmother         stomach cancer   • No Known Problems Maternal Grandfather    • Heart Disorder Paternal Grandmother    • Dementia Paternal Grandmother    • Prostate Cancer Paternal Leonette Brothers adenopathy  ABDOMEN: Soft, non distended; non tender, no masses  GYNE/:   External Genitalia: extensive erythema with excoriations across vulva with some fissures noted, no lesions, good perineal support  Urethra: meatus normal  Bladder: well supported

## 2020-08-27 LAB — HPV I/H RISK 1 DNA SPEC QL NAA+PROBE: NEGATIVE

## 2020-09-09 ENCOUNTER — OFFICE VISIT (OUTPATIENT)
Dept: OBGYN CLINIC | Facility: CLINIC | Age: 40
End: 2020-09-09
Payer: COMMERCIAL

## 2020-09-09 VITALS
HEIGHT: 61 IN | BODY MASS INDEX: 29.45 KG/M2 | SYSTOLIC BLOOD PRESSURE: 118 MMHG | WEIGHT: 156 LBS | DIASTOLIC BLOOD PRESSURE: 72 MMHG

## 2020-09-09 DIAGNOSIS — N76.3 CHRONIC VULVITIS: Primary | ICD-10-CM

## 2020-09-09 PROCEDURE — 56605 BIOPSY OF VULVA/PERINEUM: CPT | Performed by: OBSTETRICS & GYNECOLOGY

## 2020-09-09 PROCEDURE — 3074F SYST BP LT 130 MM HG: CPT | Performed by: OBSTETRICS & GYNECOLOGY

## 2020-09-09 PROCEDURE — 3078F DIAST BP <80 MM HG: CPT | Performed by: OBSTETRICS & GYNECOLOGY

## 2020-09-09 PROCEDURE — 3008F BODY MASS INDEX DOCD: CPT | Performed by: OBSTETRICS & GYNECOLOGY

## 2020-09-09 PROCEDURE — 88305 TISSUE EXAM BY PATHOLOGIST: CPT | Performed by: OBSTETRICS & GYNECOLOGY

## 2020-09-09 NOTE — PROCEDURES
Vulvar Biopsy     Procedure discussed with patient in detail including indication, risk, benefits, alternatives and complications. Consent obtained.     Lesion Description: left labia majora hypertrophy with diffuse erythema    Procedure:  Betadine wash of

## 2020-09-10 ENCOUNTER — PATIENT MESSAGE (OUTPATIENT)
Dept: OBGYN CLINIC | Facility: CLINIC | Age: 40
End: 2020-09-10

## 2020-09-11 NOTE — TELEPHONE ENCOUNTER
From: Vira Busby  To: Edgardo Medina MD  Sent: 9/10/2020 4:40 PM CDT  Subject: Visit Follow-up Question    I think I was suppose to wait and take paperwork with me yesterday?  I checked the after visit summary and it doesn't say anything about afte

## 2020-10-03 ENCOUNTER — PATIENT MESSAGE (OUTPATIENT)
Dept: FAMILY MEDICINE CLINIC | Facility: CLINIC | Age: 40
End: 2020-10-03

## 2020-10-05 NOTE — TELEPHONE ENCOUNTER
From: Rashawn Tabor  To: Ari Massey DO  Sent: 10/3/2020 3:43 AM CDT  Subject: Other    Originally this 5pm appointment was to follow up on my medications and to check in on my status with that.  But I think I am due for a physical. The system is not ac

## 2020-10-07 ENCOUNTER — OFFICE VISIT (OUTPATIENT)
Dept: OBGYN CLINIC | Facility: CLINIC | Age: 40
End: 2020-10-07
Payer: COMMERCIAL

## 2020-10-07 ENCOUNTER — OFFICE VISIT (OUTPATIENT)
Dept: FAMILY MEDICINE CLINIC | Facility: CLINIC | Age: 40
End: 2020-10-07
Payer: COMMERCIAL

## 2020-10-07 VITALS
SYSTOLIC BLOOD PRESSURE: 116 MMHG | BODY MASS INDEX: 31.34 KG/M2 | HEIGHT: 61 IN | WEIGHT: 166 LBS | DIASTOLIC BLOOD PRESSURE: 74 MMHG

## 2020-10-07 VITALS
WEIGHT: 166 LBS | SYSTOLIC BLOOD PRESSURE: 112 MMHG | OXYGEN SATURATION: 98 % | HEART RATE: 81 BPM | HEIGHT: 61 IN | BODY MASS INDEX: 31.34 KG/M2 | DIASTOLIC BLOOD PRESSURE: 74 MMHG

## 2020-10-07 DIAGNOSIS — N90.69 SQUAMOUS CELL HYPERPLASIA OF VULVA: Primary | Chronic | ICD-10-CM

## 2020-10-07 DIAGNOSIS — Z23 NEED FOR VACCINATION: ICD-10-CM

## 2020-10-07 DIAGNOSIS — H92.02 LEFT EAR PAIN: Primary | ICD-10-CM

## 2020-10-07 PROCEDURE — 3008F BODY MASS INDEX DOCD: CPT | Performed by: FAMILY MEDICINE

## 2020-10-07 PROCEDURE — 3078F DIAST BP <80 MM HG: CPT | Performed by: OBSTETRICS & GYNECOLOGY

## 2020-10-07 PROCEDURE — 3008F BODY MASS INDEX DOCD: CPT | Performed by: OBSTETRICS & GYNECOLOGY

## 2020-10-07 PROCEDURE — 99213 OFFICE O/P EST LOW 20 MIN: CPT | Performed by: FAMILY MEDICINE

## 2020-10-07 PROCEDURE — 3074F SYST BP LT 130 MM HG: CPT | Performed by: OBSTETRICS & GYNECOLOGY

## 2020-10-07 PROCEDURE — 90686 IIV4 VACC NO PRSV 0.5 ML IM: CPT | Performed by: FAMILY MEDICINE

## 2020-10-07 PROCEDURE — 90471 IMMUNIZATION ADMIN: CPT | Performed by: FAMILY MEDICINE

## 2020-10-07 PROCEDURE — 99213 OFFICE O/P EST LOW 20 MIN: CPT | Performed by: OBSTETRICS & GYNECOLOGY

## 2020-10-07 PROCEDURE — 3074F SYST BP LT 130 MM HG: CPT | Performed by: FAMILY MEDICINE

## 2020-10-07 PROCEDURE — 3078F DIAST BP <80 MM HG: CPT | Performed by: FAMILY MEDICINE

## 2020-10-07 RX ORDER — PHENTERMINE HYDROCHLORIDE 15 MG/1
15 CAPSULE ORAL EVERY MORNING
Qty: 30 CAPSULE | Refills: 0 | Status: SHIPPED | OUTPATIENT
Start: 2020-10-07 | End: 2020-11-09

## 2020-10-07 NOTE — PROGRESS NOTES
CC:  Patient presents with:  Medication Follow-Up  Ear Pain: left ear pain started about a week ago      HPI: 45year old female here to follow-up on Topamax for weight loss and discuss left ear pain x 1 week.   Ran out of Phentermine in July and never got Smokeless tobacco: Never Used      Tobacco comment: quit in 2014    Substance and Sexual Activity      Alcohol use: Yes        Comment: occasional       Drug use: No      Sexual activity: Yes        Birth control/protection: OCP    Lifestyle      Physical kg)  08/26/20 : 156 lb 14.4 oz (71.2 kg)  02/20/20 : 164 lb (74.4 kg)  11/05/19 : 174 lb (78.9 kg)  10/09/19 : 171 lb (77.6 kg)      Body mass index is 31.37 kg/m².     Physical:  General:  Alert, appropriate, no acute distress   HEENT: supple, no tonsillar

## 2020-10-08 NOTE — PROGRESS NOTES
Melanie Ayala is a 45year old female. HPI:   Patient presents with: Follow - Up: patient states Dr. 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  Sherwin Fulton

## 2020-11-09 NOTE — PROGRESS NOTES
CC:  Patient presents with:  Medication Follow-Up  Weight Loss      HPI: 45year old female presenting for video visit to discuss weight loss medication and anxiety. Has been on Phentermine 15 mg daily for the past few weeks.    Denies any significant side Sexual activity: Yes        Birth control/protection: OCP    Lifestyle      Physical activity        Days per week: Not on file        Minutes per session: Not on file      Stress: Not on file    Relationships      Social connections        Talks on phone: people? Not difficult    From the Primary Care Evaluation of Mental Disorders Patient Health Questionnaire (PRIME-MD PHQ). The PHQ was developed by Farida Boogie, Sara Alan and colleagues.  For research information, jocelyn Phentermine to 30 mg daily as she has not noticed much suppression of her appetite and she is tolerating it well without concerning side effects  - Also to continue Topiramate 75 mg each morning and can increase as needed to 100 mg daily  - Follow-up in th

## 2020-12-03 ENCOUNTER — OFFICE VISIT (OUTPATIENT)
Dept: FAMILY MEDICINE CLINIC | Facility: CLINIC | Age: 40
End: 2020-12-03
Payer: COMMERCIAL

## 2020-12-03 VITALS
TEMPERATURE: 98 F | DIASTOLIC BLOOD PRESSURE: 78 MMHG | SYSTOLIC BLOOD PRESSURE: 122 MMHG | HEIGHT: 61 IN | WEIGHT: 158 LBS | OXYGEN SATURATION: 99 % | BODY MASS INDEX: 29.83 KG/M2 | HEART RATE: 86 BPM

## 2020-12-03 DIAGNOSIS — Z00.01 ENCOUNTER FOR ROUTINE ADULT HEALTH EXAMINATION WITH ABNORMAL FINDINGS: Primary | ICD-10-CM

## 2020-12-03 DIAGNOSIS — F41.9 ANXIETY: ICD-10-CM

## 2020-12-03 DIAGNOSIS — Z76.89 ENCOUNTER FOR WEIGHT MANAGEMENT: ICD-10-CM

## 2020-12-03 PROBLEM — J98.8 CONGESTION OF UPPER AIRWAY: Status: RESOLVED | Noted: 2018-04-16 | Resolved: 2020-12-03

## 2020-12-03 PROCEDURE — 3078F DIAST BP <80 MM HG: CPT | Performed by: FAMILY MEDICINE

## 2020-12-03 PROCEDURE — 3008F BODY MASS INDEX DOCD: CPT | Performed by: FAMILY MEDICINE

## 2020-12-03 PROCEDURE — 99395 PREV VISIT EST AGE 18-39: CPT | Performed by: FAMILY MEDICINE

## 2020-12-03 PROCEDURE — 3074F SYST BP LT 130 MM HG: CPT | Performed by: FAMILY MEDICINE

## 2020-12-03 RX ORDER — FLUOXETINE HYDROCHLORIDE 20 MG/1
20 CAPSULE ORAL DAILY
Qty: 90 CAPSULE | Refills: 0 | Status: SHIPPED | OUTPATIENT
Start: 2020-12-03 | End: 2021-01-06

## 2020-12-03 RX ORDER — PHENTERMINE HYDROCHLORIDE 30 MG/1
30 CAPSULE ORAL EVERY MORNING
Qty: 30 CAPSULE | Refills: 0 | Status: SHIPPED | OUTPATIENT
Start: 2020-12-03 | End: 2020-12-08

## 2020-12-04 NOTE — PROGRESS NOTES
HPI:   April Dove is a 45year old female who presents for a complete physical exam.     Has been on Fluoxetine 20 mg daily for about 2 weeks and feels more \"comfortable\" now.  Has to really monitor her caffeine intake with the combination of Phente FLUoxetine HCl 20 MG Oral Cap Take 1 capsule (20 mg total) by mouth daily. 90 capsule 0   • Phentermine HCl 30 MG Oral Cap Take 1 capsule (30 mg total) by mouth every morning.  30 capsule 0   • MICROGESTIN FE 1.5/30 1.5-30 MG-MCG Oral Tab Take 1 tablet by m LMP 11/10/2020   SpO2 99%   BMI 29.85 kg/m²     GENERAL: well developed, well nourished, in no apparent distress   SKIN: no rashes, no suspicious lesions  HEENT: atraumatic, normocephalic, throat clear; normal dentition  EYES: PERRLA, EOMI, conjunctiva ar adequate intake of vegetables and fruits, appropriate portion sizes, minimizing highly concentrated carbohydrate foods  -Exercising 30 minutes a day most days of the week   -Diabetes screening-checked last year and negative  -Cholesterol screening-checked

## 2020-12-05 RX ORDER — PHENTERMINE HYDROCHLORIDE 30 MG/1
CAPSULE ORAL
Qty: 30 CAPSULE | Refills: 0 | OUTPATIENT
Start: 2020-12-05

## 2020-12-07 ENCOUNTER — TELEPHONE (OUTPATIENT)
Dept: FAMILY MEDICINE CLINIC | Facility: CLINIC | Age: 40
End: 2020-12-07

## 2020-12-07 RX ORDER — PHENTERMINE HYDROCHLORIDE 30 MG/1
30 CAPSULE ORAL EVERY MORNING
Qty: 30 CAPSULE | Refills: 0 | OUTPATIENT
Start: 2020-12-07

## 2020-12-08 RX ORDER — PHENTERMINE HYDROCHLORIDE 30 MG/1
30 CAPSULE ORAL EVERY MORNING
Qty: 30 CAPSULE | Refills: 0 | Status: SHIPPED | OUTPATIENT
Start: 2020-12-08 | End: 2021-01-04

## 2020-12-08 NOTE — TELEPHONE ENCOUNTER
Notified pt that office would not know that prior auth is required unless pharmacy sends us this info. Per pt, pharmacy stated they do not have active Rx for phentermine. Informed pt that this RN will call pharmacy and call pt back.  Pt confirmed that she w

## 2020-12-08 NOTE — TELEPHONE ENCOUNTER
Pt notified that per pharmacy, once they receive new Rx, med can be filled. Pt verbalized understanding and agrees with POC.

## 2020-12-08 NOTE — TELEPHONE ENCOUNTER
Did they receive the Rx I sent in on 12/3? I don't want to send a duplicate in case it gets filled twice. It says \"receipt confirmed by pharmacy. \"

## 2020-12-21 NOTE — TELEPHONE ENCOUNTER
A refill request was received for:  Requested Prescriptions     Pending Prescriptions Disp Refills   • TOPIRAMATE 25 MG Oral Tab [Pharmacy Med Name: TOPIRAMATE 25 MG TABLET] 270 tablet 1     Sig: TAKE 3 TABLETS BY MOUTH EVERY DAY     Last refill date: 06/0

## 2020-12-22 ENCOUNTER — TELEPHONE (OUTPATIENT)
Dept: FAMILY MEDICINE CLINIC | Facility: CLINIC | Age: 40
End: 2020-12-22

## 2020-12-22 ENCOUNTER — HOSPITAL ENCOUNTER (EMERGENCY)
Facility: HOSPITAL | Age: 40
Discharge: STILL ADMITTED OR ADMINISTRATIVE | End: 2020-12-22
Payer: COMMERCIAL

## 2020-12-22 ENCOUNTER — HOSPITAL ENCOUNTER (OUTPATIENT)
Age: 40
Discharge: HOME | End: 2020-12-22
Payer: COMMERCIAL

## 2020-12-22 VITALS
RESPIRATION RATE: 18 BRPM | DIASTOLIC BLOOD PRESSURE: 90 MMHG | TEMPERATURE: 99 F | HEART RATE: 109 BPM | OXYGEN SATURATION: 100 % | SYSTOLIC BLOOD PRESSURE: 149 MMHG

## 2020-12-22 DIAGNOSIS — J03.90 TONSILLITIS: ICD-10-CM

## 2020-12-22 DIAGNOSIS — Z20.822 ENCOUNTER FOR LABORATORY TESTING FOR COVID-19 VIRUS: Primary | ICD-10-CM

## 2020-12-22 PROCEDURE — 99213 OFFICE O/P EST LOW 20 MIN: CPT | Performed by: EMERGENCY MEDICINE

## 2020-12-22 PROCEDURE — 87880 STREP A ASSAY W/OPTIC: CPT | Performed by: EMERGENCY MEDICINE

## 2020-12-22 RX ORDER — CEFDINIR 300 MG/1
300 CAPSULE ORAL 2 TIMES DAILY
Qty: 20 CAPSULE | Refills: 0 | Status: SHIPPED | OUTPATIENT
Start: 2020-12-22 | End: 2021-01-01

## 2020-12-22 RX ORDER — TOPIRAMATE 25 MG/1
TABLET ORAL
Qty: 270 TABLET | Refills: 1 | Status: SHIPPED | OUTPATIENT
Start: 2020-12-22 | End: 2021-03-03

## 2020-12-22 RX ORDER — TOPIRAMATE 25 MG/1
75 TABLET ORAL DAILY
Qty: 270 TABLET | Refills: 1 | OUTPATIENT
Start: 2020-12-22

## 2020-12-22 NOTE — ED PROVIDER NOTES
Patient Seen in: [facility]      History   Patient presents with:  Sore Throat    Stated Complaint: Sore throat    Genoveva Bryant is a 45year old  female here for sore throat  That started 4 days ago.   Symptoms are worse in the morning, a 149/90   Pulse 109   Temp 98.6 °F (37 °C)   Resp 18   LMP 12/15/2020   SpO2 100%         Physical Exam  Vitals signs and nursing note reviewed. Constitutional:       Appearance: Normal appearance. She is well-developed. She is not ill-appearing.    HENT: Labs Reviewed   [facility] POCT RAPID STREP - Normal   SARS-COV-2 RNA,QUAL RT-PCR ([facility])                    MDM       Strep preliminary negative; will treat for strep based on patient's symptoms, and physical exam.  Beefy red tonsils with exudate.   Start cef

## 2020-12-22 NOTE — TELEPHONE ENCOUNTER
RN returning pt's call. Pt states that throat felt \"scratchy\" on Friday 12/15/20. PT states that throat is now sore, denies any white patches on throat, denies fever. Pt states that lymph nodes feel swollen and they are \"very visually swollen\".  Pt also

## 2020-12-22 NOTE — TELEPHONE ENCOUNTER
Pt is having a mild sore throat, a little painful, not unbearable but when she touches outside of throat, it feels swollen. Does not seem normal. The symptoms started Friday and getting a little worse.  Taking tempeture is normal

## 2021-01-04 RX ORDER — PHENTERMINE HYDROCHLORIDE 30 MG/1
CAPSULE ORAL
Qty: 30 CAPSULE | Refills: 0 | Status: SHIPPED | OUTPATIENT
Start: 2021-01-04 | End: 2021-02-02 | Stop reason: DRUGHIGH

## 2021-01-04 NOTE — TELEPHONE ENCOUNTER
A refill request was received for:  Requested Prescriptions     Pending Prescriptions Disp Refills   • PHENTERMINE HCL 30 MG Oral Cap [Pharmacy Med Name: PHENTERMINE HCL 30MG CAPSULES] 30 capsule 0     Sig: TAKE 1 CAPSULE(30 MG) BY MOUTH EVERY MORNING

## 2021-01-05 RX ORDER — FLUOXETINE HYDROCHLORIDE 20 MG/1
20 CAPSULE ORAL DAILY
Qty: 90 CAPSULE | Refills: 0 | Status: CANCELLED | OUTPATIENT
Start: 2021-01-05

## 2021-01-06 ENCOUNTER — TELEPHONE (OUTPATIENT)
Dept: FAMILY MEDICINE CLINIC | Facility: CLINIC | Age: 41
End: 2021-01-06

## 2021-01-06 RX ORDER — FLUOXETINE HYDROCHLORIDE 20 MG/1
20 CAPSULE ORAL DAILY
Qty: 90 CAPSULE | Refills: 0 | Status: SHIPPED | OUTPATIENT
Start: 2021-01-06 | End: 2021-02-02

## 2021-01-06 NOTE — TELEPHONE ENCOUNTER
Pt states she was only given #30 and pt switching from [facility] to [facility]. Med Rf as requested and pt scheduled for a video visit with AS on 1/14/21 for med f/u. Pt has been out of med for 3 days.  Pt declined for this RN to review detailed video instructions

## 2021-01-14 NOTE — PROGRESS NOTES
CC:  Patient presents with: Follow - Up: medicaton follow up      HPI: 45year old female presentingl for video visit to follow-up on anxiety and weight management. States she is doing very well overall.   Not feeling panicky and the people she talks to m Activity      Alcohol use: Yes        Frequency: 2-3 times a week        Drinks per session: 1 or 2        Binge frequency: Never        Comment: occasional       Drug use: No      Sexual activity: Yes        Partners: Male        Birth control/protection: AFFECTED AREA         Nsaids      Vitals:    01/14/21  1745   Weight: 154 lb (69.9 kg)     Wt Readings from Last 6 Encounters:  01/14/21 : 154 lb (69.9 kg)  12/03/20 : 158 lb (71.7 kg)  11/09/20 : 162 lb (73.5 kg)  10/07/20 : 166 lb (75.3 kg)  10/07/20 : 1

## 2021-02-01 ENCOUNTER — PATIENT MESSAGE (OUTPATIENT)
Dept: FAMILY MEDICINE CLINIC | Facility: CLINIC | Age: 41
End: 2021-02-01

## 2021-02-01 NOTE — TELEPHONE ENCOUNTER
Angus Hensley,      I reviewed your chart and Dr. Megha Edward did sent a refill in today. In her notes of 01/14/21, you will continue to monitor and notify her in the next few weeks if you need to increase the dose.   Also, if you look at your my chart Dr. Kathleen Espinal

## 2021-02-02 RX ORDER — FLUOXETINE 10 MG/1
CAPSULE ORAL
Qty: 90 CAPSULE | Refills: 0 | Status: SHIPPED | OUTPATIENT
Start: 2021-02-02 | End: 2021-04-06

## 2021-02-02 RX ORDER — FLUOXETINE HYDROCHLORIDE 20 MG/1
CAPSULE ORAL
Qty: 90 CAPSULE | Refills: 0 | COMMUNITY
Start: 2021-02-02 | End: 2021-04-06

## 2021-02-05 ENCOUNTER — TELEPHONE (OUTPATIENT)
Dept: FAMILY MEDICINE CLINIC | Facility: CLINIC | Age: 41
End: 2021-02-05

## 2021-02-05 RX ORDER — PHENTERMINE HYDROCHLORIDE 30 MG/1
CAPSULE ORAL
Qty: 30 CAPSULE | Refills: 0 | Status: SHIPPED | OUTPATIENT
Start: 2021-02-05 | End: 2021-03-03

## 2021-02-09 NOTE — TELEPHONE ENCOUNTER
Received response from insurance, medication was denied. Called patient and she is aware of the insurance not covering this medication. States she has been paying out of pocket for a long time.  Informed me that no need to complete PA's for this medication

## 2021-02-15 ENCOUNTER — MED REC SCAN ONLY (OUTPATIENT)
Dept: OBGYN CLINIC | Facility: CLINIC | Age: 41
End: 2021-02-15

## 2021-03-03 NOTE — PROGRESS NOTES
CC:  Patient presents with: Follow - Up      HPI: 45year old female presenting for video visit to follow-up on anxiety and weight. States the increase of Fluoxetine to 30 mg daily has helped her anxiety.   Does feel her eating has been more out of contro Yes        Partners: Male        Birth control/protection: OCP    Lifestyle      Physical activity        Days per week: Not on file        Minutes per session: Not on file      Stress: Not on file    Relationships      Social connections        Talks on p people? Not difficult at all     From the Primary Care Evaluation of Mental Disorders Patient Health Questionnaire (PRIME-MD PHQ). The PHQ was developed by Farida Severino, Bethany Guido and colleagues.  For research informatio this  - Will wean off of Topamax over the next few weeks due to fatigue concerns and continue Phentermine for now at this dose  - If continued weight gain, consider increasing Phentermine or trial of Saxenda     3.  Abnormal mammogram    - Overdue for follo

## 2021-04-05 RX ORDER — FLUOXETINE HYDROCHLORIDE 20 MG/1
CAPSULE ORAL
Qty: 90 CAPSULE | Refills: 0 | OUTPATIENT
Start: 2021-04-05

## 2021-04-11 ENCOUNTER — IMMUNIZATION (OUTPATIENT)
Dept: LAB | Age: 41
End: 2021-04-11
Attending: HOSPITALIST
Payer: COMMERCIAL

## 2021-04-11 DIAGNOSIS — Z23 NEED FOR VACCINATION: Primary | ICD-10-CM

## 2021-04-11 PROCEDURE — 0001A SARSCOV2 VAC 30MCG/0.3ML IM: CPT

## 2021-05-02 ENCOUNTER — IMMUNIZATION (OUTPATIENT)
Dept: LAB | Age: 41
End: 2021-05-02
Attending: HOSPITALIST
Payer: COMMERCIAL

## 2021-05-02 DIAGNOSIS — Z23 NEED FOR VACCINATION: Primary | ICD-10-CM

## 2021-05-02 PROCEDURE — 0002A SARSCOV2 VAC 30MCG/0.3ML IM: CPT

## 2021-05-27 ENCOUNTER — PATIENT MESSAGE (OUTPATIENT)
Dept: FAMILY MEDICINE CLINIC | Facility: CLINIC | Age: 41
End: 2021-05-27

## 2021-05-28 NOTE — TELEPHONE ENCOUNTER
Sami Freeman DO 5/27/2021 11:06 PM CDT    Let's schedule a video visit to discuss since it has been a few months.  ----- Message -----  From: Cash Bone RN  Sent: 5/27/2021 10:39 AM CDT  To: Paty Bryant DO  Subject: FW: Prescription Question     Should

## 2021-05-28 NOTE — TELEPHONE ENCOUNTER
Called pt and scheduled video visit for June 3rd 2021. Pt verbalized understanding and agrees with POC.

## 2021-06-03 ENCOUNTER — TELEPHONE (OUTPATIENT)
Dept: FAMILY MEDICINE CLINIC | Facility: CLINIC | Age: 41
End: 2021-06-03

## 2021-06-03 RX ORDER — TOPIRAMATE 25 MG/1
TABLET ORAL
Qty: 180 TABLET | Refills: 0 | Status: SHIPPED | OUTPATIENT
Start: 2021-06-03 | End: 2021-08-25

## 2021-06-03 NOTE — PROGRESS NOTES
CC:  Patient presents with:  Medication Follow-Up      HPI: 45year old female presenting for video visit to follow-up on medication for anxiety and weight loss.    Feels her anxiety has been very well controlled and mentally she feels like she is in a grea Used      Tobacco comment: quit in 2014    Vaping Use      Vaping Use: Never used    Substance and Sexual Activity      Alcohol use: Yes        Comment: occasional       Drug use: No      Sexual activity: Yes        Partners: Male        Birth control/prot MG Oral Cap Take 1 capsule (37.5 mg total) by mouth every morning. 30 capsule 1   • triamcinolone acetonide 0.1 % External Ointment Apply 1 Application topically 2 (two) times daily.  APPLY TO AFFECTED AREA     • MICROGESTIN FE 1.5/30 1.5-30 MG-MCG Oral Tab

## 2021-06-14 ENCOUNTER — HOSPITAL ENCOUNTER (OUTPATIENT)
Dept: MAMMOGRAPHY | Facility: HOSPITAL | Age: 41
Discharge: HOME | End: 2021-06-14
Attending: FAMILY MEDICINE
Payer: COMMERCIAL

## 2021-06-14 ENCOUNTER — HOSPITAL ENCOUNTER (OUTPATIENT)
Dept: ULTRASOUND IMAGING | Facility: HOSPITAL | Age: 41
Discharge: HOME | End: 2021-06-14
Attending: FAMILY MEDICINE
Payer: COMMERCIAL

## 2021-06-14 DIAGNOSIS — R92.8 ABNORMAL MAMMOGRAM: ICD-10-CM

## 2021-06-14 PROCEDURE — 77062 BREAST TOMOSYNTHESIS BI: CPT | Performed by: FAMILY MEDICINE

## 2021-06-14 PROCEDURE — 76642 ULTRASOUND BREAST LIMITED: CPT | Performed by: FAMILY MEDICINE

## 2021-06-14 PROCEDURE — 77066 DX MAMMO INCL CAD BI: CPT | Performed by: FAMILY MEDICINE

## 2021-06-30 RX ORDER — FLUOXETINE HYDROCHLORIDE 20 MG/1
CAPSULE ORAL
Qty: 90 CAPSULE | Refills: 1 | Status: SHIPPED | OUTPATIENT
Start: 2021-06-30 | End: 2021-10-22

## 2021-06-30 NOTE — TELEPHONE ENCOUNTER
A refill request was received for:  Requested Prescriptions     Pending Prescriptions Disp Refills   • FLUOXETINE HCL 20 MG Oral Cap [Pharmacy Med Name: FLUOXETINE HCL 20 MG CAPSULE] 90 capsule 0     Sig: TAKE 30 MG BY MOUTH DAILY (TAKE WITH 10 MG CAPSULE)

## 2021-07-19 RX ORDER — FLUOXETINE 10 MG/1
CAPSULE ORAL
Qty: 90 CAPSULE | Refills: 1 | Status: SHIPPED | OUTPATIENT
Start: 2021-07-19 | End: 2021-10-22 | Stop reason: DRUGHIGH

## 2021-07-19 NOTE — TELEPHONE ENCOUNTER
A refill request was received for:  Requested Prescriptions     Pending Prescriptions Disp Refills   • FLUoxetine HCl 10 MG Oral Cap [Pharmacy Med Name: FLUOXETINE HCL 10 MG CAPSULE] 90 capsule 0     Sig: TAKE 1 CAPSULE BY MOUTH DAILY (TAKE WITH 20 MG CAPS

## 2021-07-30 ENCOUNTER — HOSPITAL ENCOUNTER (OUTPATIENT)
Age: 41
Discharge: HOME | End: 2021-07-30
Payer: COMMERCIAL

## 2021-07-30 VITALS
OXYGEN SATURATION: 100 % | TEMPERATURE: 98 F | DIASTOLIC BLOOD PRESSURE: 72 MMHG | RESPIRATION RATE: 18 BRPM | SYSTOLIC BLOOD PRESSURE: 136 MMHG | HEART RATE: 117 BPM

## 2021-07-30 DIAGNOSIS — Z18.81 GLASS FOREIGN BODY IN SKIN: ICD-10-CM

## 2021-07-30 DIAGNOSIS — S51.811A LACERATION OF RIGHT FOREARM, INITIAL ENCOUNTER: Primary | ICD-10-CM

## 2021-07-30 DIAGNOSIS — T14.8XXA GLASS FOREIGN BODY IN SKIN: ICD-10-CM

## 2021-07-30 PROCEDURE — 12002 RPR S/N/AX/GEN/TRNK2.6-7.5CM: CPT | Performed by: EMERGENCY MEDICINE

## 2021-07-30 PROCEDURE — 99214 OFFICE O/P EST MOD 30 MIN: CPT | Performed by: EMERGENCY MEDICINE

## 2021-07-30 RX ORDER — CEFADROXIL 500 MG/1
500 CAPSULE ORAL 2 TIMES DAILY
Qty: 14 CAPSULE | Refills: 0 | Status: SHIPPED | OUTPATIENT
Start: 2021-07-30 | End: 2021-08-06

## 2021-07-30 RX ORDER — METHOCARBAMOL 500 MG/1
500 TABLET, FILM COATED ORAL 3 TIMES DAILY PRN
Qty: 10 TABLET | Refills: 0 | Status: SHIPPED | OUTPATIENT
Start: 2021-07-30 | End: 2021-10-13

## 2021-07-30 NOTE — ED PROVIDER NOTES
Patient Seen in: [facility]      History   Patient presents with:  Laceration/Abrasion: Storm door glass broke on arm. - Entered by patient    Stated Complaint: Laceration/Abrasion - Storm door glass broke on arm.     HPI/Subjective:   HPI  A Conjunctiva/sclera: Conjunctivae normal.      Pupils: Pupils are equal, round, and reactive to light. Pulmonary:      Effort: Pulmonary effort is normal. No respiratory distress. Musculoskeletal:      Cervical back: Normal range of motion.    Skin: worsening or any persistent conditions. All questions answered. No acute distress and cleared for home.       Disposition and Plan     Clinical Impression:  Laceration of right forearm, initial encounter  (primary encounter diagnosis)  Glass foreign body in

## 2021-07-30 NOTE — ED INITIAL ASSESSMENT (HPI)
Pt was vacuuming and glass door insert fell on right arm and shattered. Pt has laceration to right forearm. , bleeding controlled.  Pt has small pieces of glass to fingers and forearm

## 2021-08-25 RX ORDER — TOPIRAMATE 25 MG/1
TABLET ORAL
Qty: 180 TABLET | Refills: 0 | Status: SHIPPED | OUTPATIENT
Start: 2021-08-25 | End: 2021-10-13

## 2021-09-19 ENCOUNTER — APPOINTMENT (OUTPATIENT)
Dept: GENERAL RADIOLOGY | Age: 41
End: 2021-09-19
Attending: NURSE PRACTITIONER
Payer: COMMERCIAL

## 2021-09-19 ENCOUNTER — HOSPITAL ENCOUNTER (OUTPATIENT)
Age: 41
Discharge: HOME | End: 2021-09-19
Payer: COMMERCIAL

## 2021-09-19 VITALS
SYSTOLIC BLOOD PRESSURE: 153 MMHG | RESPIRATION RATE: 18 BRPM | TEMPERATURE: 98 F | HEART RATE: 84 BPM | OXYGEN SATURATION: 100 % | DIASTOLIC BLOOD PRESSURE: 84 MMHG

## 2021-09-19 DIAGNOSIS — S93.601A SPRAIN OF RIGHT FOOT, INITIAL ENCOUNTER: Primary | ICD-10-CM

## 2021-09-19 DIAGNOSIS — L08.9 HAND ABRASION, INFECTED, LEFT, INITIAL ENCOUNTER: ICD-10-CM

## 2021-09-19 DIAGNOSIS — S91.311A LACERATION OF RIGHT FOOT, INITIAL ENCOUNTER: ICD-10-CM

## 2021-09-19 DIAGNOSIS — S60.512A HAND ABRASION, INFECTED, LEFT, INITIAL ENCOUNTER: ICD-10-CM

## 2021-09-19 PROCEDURE — 99213 OFFICE O/P EST LOW 20 MIN: CPT | Performed by: NURSE PRACTITIONER

## 2021-09-19 PROCEDURE — 73630 X-RAY EXAM OF FOOT: CPT | Performed by: NURSE PRACTITIONER

## 2021-09-19 PROCEDURE — L3260 AMBULATORY SURGICAL BOOT EAC: HCPCS | Performed by: NURSE PRACTITIONER

## 2021-09-19 NOTE — ED PROVIDER NOTES
Patient Seen in: [facility]      History   Patient presents with:  Leg or Foot Injury    Stated Complaint: RT FOOT INJURY    Subjective:   Well-appearing 62-year-old female presents with complaints of right foot pain after falling yesterday. alert, oriented to person, place and time. Pt appears non-toxic. HEENT: Head is normocephalic, atraumatic. Pupils reactive bilaterally. Nonicteric sclera, no conjunctival injection. No facial droop or slurred speech. No oral lesions or pallor.  Mucous m TISSUES: Suggestion of moderate dorsal soft tissue swelling of the forefoot.  Correlate clinically. EFFUSION: None visible. OTHER: Negative.      Impression  CONCLUSION:   1.  No acute appearing fracture or dislocation.  Pes cavus.  Suggestion of modera

## 2021-09-19 NOTE — ED INITIAL ASSESSMENT (HPI)
Pt here with rigth foot pain, pt states she tripped on some gravel yesterday and cut her right great toe, pt states today her right foot is swollen, pt states right foot is tender

## 2021-09-21 RX ORDER — NORETHINDRONE ACETATE AND ETHINYL ESTRADIOL AND FERROUS FUMARATE 1.5-30(21)
KIT ORAL
Qty: 84 TABLET | Refills: 3 | Status: SHIPPED | OUTPATIENT
Start: 2021-09-21 | End: 2021-12-29

## 2021-09-21 NOTE — TELEPHONE ENCOUNTER
Gynecology Medication Protocol Passed 09/15/2021 12:02 AM    PASS-PENDING LAST PAP WNL--VIA MANUAL LOOKUP    Physical or Pelvic/Breast in past 12 or next 3 mos--VIA MANUAL LOOKUP     A refill request was received for:  Requested Prescriptions     Pending P

## 2021-10-13 ENCOUNTER — OFFICE VISIT (OUTPATIENT)
Dept: FAMILY MEDICINE CLINIC | Facility: CLINIC | Age: 41
End: 2021-10-13
Payer: COMMERCIAL

## 2021-10-13 ENCOUNTER — LAB ENCOUNTER (OUTPATIENT)
Dept: LAB | Facility: REFERENCE LAB | Age: 41
End: 2021-10-13
Attending: FAMILY MEDICINE
Payer: COMMERCIAL

## 2021-10-13 ENCOUNTER — MED REC SCAN ONLY (OUTPATIENT)
Dept: FAMILY MEDICINE CLINIC | Facility: CLINIC | Age: 41
End: 2021-10-13

## 2021-10-13 VITALS
OXYGEN SATURATION: 98 % | HEIGHT: 61 IN | BODY MASS INDEX: 35.3 KG/M2 | DIASTOLIC BLOOD PRESSURE: 78 MMHG | SYSTOLIC BLOOD PRESSURE: 120 MMHG | HEART RATE: 87 BPM | WEIGHT: 187 LBS

## 2021-10-13 DIAGNOSIS — Z00.00 ENCOUNTER FOR ROUTINE ADULT HEALTH EXAMINATION WITHOUT ABNORMAL FINDINGS: ICD-10-CM

## 2021-10-13 DIAGNOSIS — Z00.00 ENCOUNTER FOR ROUTINE ADULT HEALTH EXAMINATION WITHOUT ABNORMAL FINDINGS: Primary | ICD-10-CM

## 2021-10-13 DIAGNOSIS — Z76.89 ENCOUNTER FOR WEIGHT MANAGEMENT: ICD-10-CM

## 2021-10-13 DIAGNOSIS — F41.9 ANXIETY: ICD-10-CM

## 2021-10-13 PROCEDURE — 80053 COMPREHEN METABOLIC PANEL: CPT

## 2021-10-13 PROCEDURE — 80061 LIPID PANEL: CPT

## 2021-10-13 PROCEDURE — 99396 PREV VISIT EST AGE 40-64: CPT | Performed by: FAMILY MEDICINE

## 2021-10-13 PROCEDURE — 36415 COLL VENOUS BLD VENIPUNCTURE: CPT

## 2021-10-13 PROCEDURE — 85025 COMPLETE CBC W/AUTO DIFF WBC: CPT

## 2021-10-13 PROCEDURE — 3074F SYST BP LT 130 MM HG: CPT | Performed by: FAMILY MEDICINE

## 2021-10-13 PROCEDURE — 83036 HEMOGLOBIN GLYCOSYLATED A1C: CPT

## 2021-10-13 PROCEDURE — 3008F BODY MASS INDEX DOCD: CPT | Performed by: FAMILY MEDICINE

## 2021-10-13 PROCEDURE — 99213 OFFICE O/P EST LOW 20 MIN: CPT | Performed by: FAMILY MEDICINE

## 2021-10-13 PROCEDURE — 3078F DIAST BP <80 MM HG: CPT | Performed by: FAMILY MEDICINE

## 2021-10-13 PROCEDURE — 86803 HEPATITIS C AB TEST: CPT

## 2021-10-13 RX ORDER — MENTHOL 5.8 MG/1
LOZENGE ORAL
COMMUNITY

## 2021-10-13 RX ORDER — DIETHYLPROPION HYDROCHLORIDE 75 MG/1
75 TABLET ORAL DAILY
Qty: 30 TABLET | Refills: 0 | Status: SHIPPED | OUTPATIENT
Start: 2021-10-13 | End: 2021-11-11

## 2021-10-13 NOTE — PATIENT INSTRUCTIONS
Prevention Guidelines, Women Ages 36 to 52  Screening tests and vaccines are an important part of managing your health. A screening test is done to find diseases in people who don't have any symptoms.  The goal is to find a disease early so lifestyle mercer sigmoidoscopy every 5 years, or  · Colonoscopy every 10 years, or  · CT colonography (virtual colonoscopy) every 5 years, or  · Yearly fecal occult blood test, or  · Yearly fecal immunochemical test every year, or  · Stool DNA test, every 3 years  If you c least 4 weeks after the first dose   Hepatitis A Women at increased risk for infection–talk with your healthcare provider 2 doses given 6 months apart   Hepatitis B Women at increased risk for infection–talk with your healthcare provider 3 doses over 6 mon American Academy of Ophthalmology  Stella last reviewed this educational content on 11/1/2017  © 3778-7203 [employer]. All rights reserved. This information is not intended as a substitute for professional medical care.  Always follow your

## 2021-10-13 NOTE — PROGRESS NOTES
HPI:   Santi Patterson is a 37year old female who presents for a complete physical exam.   Patient presents with:  Physical  Forms Completion: for an FOID card    Checked herself into a mental health facility in 2008 after having a miscarriage and having discharge   LUNGS: denies shortness of breath  CARDIOVASCULAR: denies chest pain  GI: denies abdominal pain,  No constipation or diarrhea, no hematochezia  : denies dysuria, vaginal discharge or itching  NEURO: denies headaches  PSYCHE: denies depression History:   Social History    Tobacco Use      Smoking status: Former Smoker      Smokeless tobacco: Never Used      Tobacco comment: quit in 2014    Vaping Use      Vaping Use: Never used    Alcohol use: Yes      Comment: occasional     Drug use: No    Occ me know how she is doing in the next month. Will trial Diethylpropion for weight loss as she plateaued after long-term success with Phentermine and Topamax. Also referred to weight loss clinic for further recommendations.    Will complete form to allow her

## 2021-11-12 RX ORDER — DIETHYLPROPION HYDROCHLORIDE 75 MG/1
TABLET ORAL
Qty: 30 TABLET | Refills: 0 | OUTPATIENT
Start: 2021-11-12

## 2021-11-12 RX ORDER — DIETHYLPROPION HYDROCHLORIDE 75 MG/1
75 TABLET ORAL DAILY
Qty: 30 TABLET | Refills: 0 | Status: SHIPPED | OUTPATIENT
Start: 2021-11-12 | End: 2021-12-09

## 2021-11-12 NOTE — TELEPHONE ENCOUNTER
A refill request was received for:  Requested Prescriptions     Pending Prescriptions Disp Refills   • Diethylpropion HCl ER 75 MG Oral Tablet 24 Hr 30 tablet 0     Sig: Take 1 tablet (75 mg total) by mouth daily.      Last refill date: 10/13/2021  Qty: 30

## 2021-12-09 ENCOUNTER — OFFICE VISIT (OUTPATIENT)
Dept: SURGERY | Facility: CLINIC | Age: 41
End: 2021-12-09
Payer: COMMERCIAL

## 2021-12-09 VITALS
WEIGHT: 184.81 LBS | HEIGHT: 61.42 IN | DIASTOLIC BLOOD PRESSURE: 82 MMHG | SYSTOLIC BLOOD PRESSURE: 140 MMHG | OXYGEN SATURATION: 98 % | HEART RATE: 78 BPM | BODY MASS INDEX: 34.45 KG/M2

## 2021-12-09 DIAGNOSIS — R03.0 ELEVATED BLOOD PRESSURE READING: ICD-10-CM

## 2021-12-09 DIAGNOSIS — E66.9 OBESITY (BMI 30-39.9): ICD-10-CM

## 2021-12-09 DIAGNOSIS — R63.5 WEIGHT GAIN: ICD-10-CM

## 2021-12-09 DIAGNOSIS — F41.9 ANXIETY: ICD-10-CM

## 2021-12-09 DIAGNOSIS — Z51.81 ENCOUNTER FOR THERAPEUTIC DRUG MONITORING: Primary | ICD-10-CM

## 2021-12-09 PROCEDURE — 3008F BODY MASS INDEX DOCD: CPT | Performed by: NURSE PRACTITIONER

## 2021-12-09 PROCEDURE — 3077F SYST BP >= 140 MM HG: CPT | Performed by: NURSE PRACTITIONER

## 2021-12-09 PROCEDURE — 3079F DIAST BP 80-89 MM HG: CPT | Performed by: NURSE PRACTITIONER

## 2021-12-09 PROCEDURE — 99214 OFFICE O/P EST MOD 30 MIN: CPT | Performed by: NURSE PRACTITIONER

## 2021-12-09 RX ORDER — DIETHYLPROPION HYDROCHLORIDE 75 MG/1
75 TABLET ORAL DAILY
Qty: 30 TABLET | Refills: 1 | Status: SHIPPED | OUTPATIENT
Start: 2021-12-09 | End: 2022-01-26

## 2021-12-09 RX ORDER — NALTREXONE HYDROCHLORIDE AND BUPROPION HYDROCHLORIDE 8; 90 MG/1; MG/1
TABLET, EXTENDED RELEASE ORAL
Qty: 120 TABLET | Refills: 1 | Status: SHIPPED | OUTPATIENT
Start: 2021-12-09 | End: 2021-12-17

## 2021-12-09 NOTE — PATIENT INSTRUCTIONS
Exercise:   Dedicate 30 minutes 3 days/week. Continue diethylpropion. Start Contrave, titrate up as tolerated. Contrave:     Contrave contains naltrexone which is a medication used to treat addiction/cravings such as opioids/alcohol.  It's use

## 2021-12-09 NOTE — PROGRESS NOTES
[facility] Consultation Note       Date of Consult:  2021    Patient:  Dewey Randall  :      1980  MRN:      [id number]    Referring Provider: Dr. Minal Raymundo       Chief Complaint:  Patient presents with:  Consult  Weight Jayde MG Oral Tablet 12 Hr Week 1: 1 tablet in AM. Week 2: 1 tablet in AM and PM. Week 3: 2 tablets in AM, 1 tablet in PM. Week 4: Cascade 2 tablets in AM and PM. 120 tablet 1   • Diethylpropion HCl ER 75 MG Oral Tablet 24 Hr Take 1 tablet (75 mg total) by mouth file    Social Determinants of Health  Financial Resource Strain: Not on file  Food Insecurity: Not on file  Transportation Needs: Not on file  Physical Activity: Not on file  Stress: Not on file  Social Connections: Not on file  Intimate Partner Violence: Eat 3-4 cups of fresh fruit or vegetables daily    Behavior Modifications Reviewed and Discussed:    Eat breakfast, Eat 3 meals per day, Plan meals in advance, Read nutrition labels, Drink 64oz of water per day, Maintain a daily food journal, Utilize porti EKG 12-LEAD;  Future    Elevated blood pressure reading    Other orders  -     Naltrexone-buPROPion HCl ER (CONTRAVE) 8-90 MG Oral Tablet 12 Hr; Week 1: 1 tablet in AM. Week 2: 1 tablet in AM and PM. Week 3: 2 tablets in AM, 1 tablet in PM. Week 4: [place] 2 vegetables, 2-3 starches, 3 protein per day; Breakfast and snack ideas per patient instructions. IF 12 hours. Meet with Kenzie Rubio. Start [employer] or discuss Jump Start next visit. Exercise:   Dedicate 30 minutes 3 days/week.      BP elevated today, re

## 2021-12-17 RX ORDER — NALTREXONE HYDROCHLORIDE AND BUPROPION HYDROCHLORIDE 8; 90 MG/1; MG/1
TABLET, EXTENDED RELEASE ORAL
Qty: 120 TABLET | Refills: 1 | Status: SHIPPED | OUTPATIENT
Start: 2021-12-17

## 2021-12-29 ENCOUNTER — TELEPHONE (OUTPATIENT)
Dept: OBGYN CLINIC | Facility: CLINIC | Age: 41
End: 2021-12-29

## 2021-12-29 RX ORDER — NORETHINDRONE ACETATE AND ETHINYL ESTRADIOL 1.5-30(21)
1 KIT ORAL DAILY
Qty: 84 TABLET | Refills: 0 | Status: SHIPPED | OUTPATIENT
Start: 2021-12-29 | End: 2022-01-25

## 2021-12-29 NOTE — TELEPHONE ENCOUNTER
Fax rec'd from [facility] stating \"Drug Microgestion FE discontinued, please send new script for something else\". Per Dr. Raven Ordaz ok to change to Junel FE 1.5/30 or Lo-Ogestrel 0.3/30.     This MA sent new script for Norethin Ace-Eth Estrad-FE (Ruth Cumminse

## 2022-01-04 ENCOUNTER — TELEPHONE (OUTPATIENT)
Dept: FAMILY MEDICINE CLINIC | Facility: CLINIC | Age: 42
End: 2022-01-04

## 2022-01-04 ENCOUNTER — NURSE ONLY (OUTPATIENT)
Dept: LAB | Facility: HOSPITAL | Age: 42
End: 2022-01-04
Attending: FAMILY MEDICINE
Payer: COMMERCIAL

## 2022-01-04 DIAGNOSIS — U07.1 COVID-19: Primary | ICD-10-CM

## 2022-01-04 DIAGNOSIS — U07.1 COVID-19: ICD-10-CM

## 2022-01-04 RX ORDER — FLUOXETINE HYDROCHLORIDE 20 MG/1
CAPSULE ORAL
Qty: 90 CAPSULE | Refills: 1 | OUTPATIENT
Start: 2022-01-04

## 2022-01-04 NOTE — TELEPHONE ENCOUNTER
RN contacted pt, pt denies any known exposure, confirms prior listed symptoms of sore throat, congestion and headache. RN placed covid test order, provided number to central scheduling, RN provided ED precautions.

## 2022-01-04 NOTE — TELEPHONE ENCOUNTER
The patient is calling to ask that the doctor order her a Covid test. She is having headaches,throat irritation and congestion.

## 2022-01-06 LAB — SARS-COV-2 RNA RESP QL NAA+PROBE: NOT DETECTED

## 2022-01-17 RX ORDER — FLUOXETINE HYDROCHLORIDE 40 MG/1
CAPSULE ORAL
Qty: 90 CAPSULE | Refills: 0 | Status: SHIPPED | OUTPATIENT
Start: 2022-01-17

## 2022-01-17 RX ORDER — FLUOXETINE 10 MG/1
CAPSULE ORAL
Qty: 90 CAPSULE | Refills: 1 | OUTPATIENT
Start: 2022-01-17

## 2022-01-17 NOTE — TELEPHONE ENCOUNTER
A refill request was received for:  Requested Prescriptions     Pending Prescriptions Disp Refills   • FLUOXETINE HCL 40 MG Oral Cap [Pharmacy Med Name: FLUOXETINE HCL 40 MG CAPSULE] 90 capsule 0     Sig: TAKE 1 CAPSULE BY MOUTH EVERY DAY     Refused Presc

## 2022-01-25 ENCOUNTER — EKG ENCOUNTER (OUTPATIENT)
Dept: LAB | Age: 42
End: 2022-01-25
Attending: NURSE PRACTITIONER
Payer: COMMERCIAL

## 2022-01-25 ENCOUNTER — OFFICE VISIT (OUTPATIENT)
Dept: OBGYN CLINIC | Facility: CLINIC | Age: 42
End: 2022-01-25
Payer: COMMERCIAL

## 2022-01-25 VITALS
WEIGHT: 180.69 LBS | BODY MASS INDEX: 33.68 KG/M2 | HEIGHT: 61.42 IN | SYSTOLIC BLOOD PRESSURE: 126 MMHG | DIASTOLIC BLOOD PRESSURE: 74 MMHG

## 2022-01-25 DIAGNOSIS — R63.5 WEIGHT GAIN: ICD-10-CM

## 2022-01-25 DIAGNOSIS — Z01.419 WOMEN'S ANNUAL ROUTINE GYNECOLOGICAL EXAMINATION: Primary | ICD-10-CM

## 2022-01-25 DIAGNOSIS — E66.9 OBESITY (BMI 30-39.9): ICD-10-CM

## 2022-01-25 DIAGNOSIS — F41.9 ANXIETY: ICD-10-CM

## 2022-01-25 DIAGNOSIS — Z12.31 BREAST CANCER SCREENING BY MAMMOGRAM: ICD-10-CM

## 2022-01-25 DIAGNOSIS — Z30.41 ENCOUNTER FOR SURVEILLANCE OF CONTRACEPTIVE PILLS: ICD-10-CM

## 2022-01-25 PROCEDURE — 93010 ELECTROCARDIOGRAM REPORT: CPT | Performed by: NURSE PRACTITIONER

## 2022-01-25 PROCEDURE — 99396 PREV VISIT EST AGE 40-64: CPT | Performed by: OBSTETRICS & GYNECOLOGY

## 2022-01-25 PROCEDURE — 3074F SYST BP LT 130 MM HG: CPT | Performed by: OBSTETRICS & GYNECOLOGY

## 2022-01-25 PROCEDURE — 3008F BODY MASS INDEX DOCD: CPT | Performed by: OBSTETRICS & GYNECOLOGY

## 2022-01-25 PROCEDURE — 93005 ELECTROCARDIOGRAM TRACING: CPT

## 2022-01-25 PROCEDURE — 3078F DIAST BP <80 MM HG: CPT | Performed by: OBSTETRICS & GYNECOLOGY

## 2022-01-25 RX ORDER — NORETHINDRONE ACETATE AND ETHINYL ESTRADIOL 1.5-30(21)
1 KIT ORAL DAILY
Qty: 84 TABLET | Refills: 0 | Status: SHIPPED | OUTPATIENT
Start: 2022-01-25 | End: 2022-04-19

## 2022-01-25 NOTE — PROGRESS NOTES
GYN ANNUAL    1/25/2022  10:20 AM    Patient presents with: Annual: physical  .    HPI: Patient is a 37year old [id number] LMP 1/4/22 presents for annual gyn exam and OCP refill. Cycles light and well controlled on pill.  Managing vulvar dermatitis with topi Past Surgical History:   Procedure Laterality Date   •   05/10/2013   •   2015   • D & C  2007    SPAB    • FOOT SURGERY Bilateral 2012    x 2    • LEEP      in early 20's for abnormal pap    • OTHER SURGICAL HISTORY         Nsa normal  Bladder: well supported  Vagina: normal mucosa, no lesions, no discharge   Uterus: normal size, mobile, nontender  Cervix: closed os, no lesions or bleeding  Adnexa: normal size, bilaterally nontender, no palpable masses  Cul-de-sac: normal  R/V: n

## 2022-01-26 ENCOUNTER — OFFICE VISIT (OUTPATIENT)
Dept: SURGERY | Facility: CLINIC | Age: 42
End: 2022-01-26
Payer: COMMERCIAL

## 2022-01-26 VITALS
OXYGEN SATURATION: 99 % | HEART RATE: 78 BPM | DIASTOLIC BLOOD PRESSURE: 82 MMHG | HEIGHT: 61.42 IN | SYSTOLIC BLOOD PRESSURE: 136 MMHG | WEIGHT: 180.5 LBS | BODY MASS INDEX: 33.64 KG/M2

## 2022-01-26 DIAGNOSIS — F41.9 ANXIETY: ICD-10-CM

## 2022-01-26 DIAGNOSIS — R03.0 ELEVATED BLOOD PRESSURE READING: ICD-10-CM

## 2022-01-26 DIAGNOSIS — E66.9 OBESITY (BMI 30-39.9): ICD-10-CM

## 2022-01-26 DIAGNOSIS — Z51.81 ENCOUNTER FOR THERAPEUTIC DRUG MONITORING: Primary | ICD-10-CM

## 2022-01-26 PROCEDURE — 99214 OFFICE O/P EST MOD 30 MIN: CPT | Performed by: NURSE PRACTITIONER

## 2022-01-26 PROCEDURE — 3075F SYST BP GE 130 - 139MM HG: CPT | Performed by: NURSE PRACTITIONER

## 2022-01-26 PROCEDURE — 3008F BODY MASS INDEX DOCD: CPT | Performed by: NURSE PRACTITIONER

## 2022-01-26 PROCEDURE — 3079F DIAST BP 80-89 MM HG: CPT | Performed by: NURSE PRACTITIONER

## 2022-01-26 NOTE — PATIENT INSTRUCTIONS
Hold diethylpropion. Increase Contrave. Whole Food Challenge.      Chickpea salad:  Jessie Level a bed spinach  1 can chickpeas, whole  Dice celery to your liking  Dice green onion to your liking  1 dill pickles  Dice red pepper to your liking  Garlic to your

## 2022-01-26 NOTE — PROGRESS NOTES
[address], [place] [address]  [address] [facility]  Dept: [phone]       Patient:  Elvira Red  :      1980  MRN:      [id number]    Chief Complaint:  Patient prese weight loss: -04   Total weight loss: -04   Start weight: 184    Wt Readings from Last 6 Encounters:  01/26/22 : 180 lb 8 oz (81.9 kg)  01/25/22 : 180 lb 11.2 oz (82 kg)  12/09/21 : 184 lb 12.8 oz (83.8 kg)  10/13/21 : 187 lb (84.8 kg)  03/03/21 : 160 lb ( Diet: Not Asked        Stress Concern: No        Weight Concern: Yes         Service: Not Asked        Blood Transfusions: Not Asked        Occupational Exposure: Not Asked        Hobby Hazards: Not Asked        Sleep Concern: Not Asked        Back Journal  · Reviewed and Discussed:       · Patient has a Food Journal?: yes [employer]   · Patient is reading nutrition labels? yes  · Average Caloric Intake:     · Average CHO Intake:   · Is patient exercising? yes  · Type of exercise?  Other:  [address] tenderness/mass/nodules  Lungs: clear to auscultation bilaterally  Heart: S1, S2 normal, no murmur, click, rub or gallop, regular rate and rhythm  Abdomen: soft, non-tender; bowel sounds normal; no masses,  no organomegaly  Extremities: extremities normal, loss. +anxiety on diethylpropion.       Hold or decrease diethylpropion to 1/2 tablet in the AM- + anxiety. Patient's preference.      Continue Contrave, titrate up as tolerated. Start [employer] included with medication.    Will pay OOP.     Prefers not to do ta

## 2022-03-09 ENCOUNTER — OFFICE VISIT (OUTPATIENT)
Dept: SURGERY | Facility: CLINIC | Age: 42
End: 2022-03-09
Payer: COMMERCIAL

## 2022-03-09 VITALS
HEIGHT: 61.42 IN | DIASTOLIC BLOOD PRESSURE: 80 MMHG | SYSTOLIC BLOOD PRESSURE: 130 MMHG | OXYGEN SATURATION: 100 % | WEIGHT: 183.5 LBS | BODY MASS INDEX: 34.2 KG/M2 | HEART RATE: 74 BPM

## 2022-03-09 DIAGNOSIS — R63.5 WEIGHT GAIN: ICD-10-CM

## 2022-03-09 DIAGNOSIS — F41.9 ANXIETY: ICD-10-CM

## 2022-03-09 DIAGNOSIS — Z51.81 ENCOUNTER FOR THERAPEUTIC DRUG MONITORING: Primary | ICD-10-CM

## 2022-03-09 DIAGNOSIS — E66.9 OBESITY (BMI 30-39.9): ICD-10-CM

## 2022-03-09 PROCEDURE — 3075F SYST BP GE 130 - 139MM HG: CPT | Performed by: NURSE PRACTITIONER

## 2022-03-09 PROCEDURE — 3079F DIAST BP 80-89 MM HG: CPT | Performed by: NURSE PRACTITIONER

## 2022-03-09 PROCEDURE — 3008F BODY MASS INDEX DOCD: CPT | Performed by: NURSE PRACTITIONER

## 2022-03-09 PROCEDURE — 99213 OFFICE O/P EST LOW 20 MIN: CPT | Performed by: NURSE PRACTITIONER

## 2022-03-09 RX ORDER — PHENTERMINE AND TOPIRAMATE 3.75; 23 MG/1; MG/1
1 CAPSULE, EXTENDED RELEASE ORAL DAILY
Qty: 30 CAPSULE | Refills: 1 | Status: SHIPPED | OUTPATIENT
Start: 2022-03-09

## 2022-03-10 ENCOUNTER — TELEPHONE (OUTPATIENT)
Dept: SURGERY | Facility: CLINIC | Age: 42
End: 2022-03-10

## 2022-03-10 NOTE — TELEPHONE ENCOUNTER
Office received a fax response for Prior approval of Qsymia medication through [facility]. Patient's coverage through 6  /10/2022. Faxed response to [facility] pharmacy in [place] [facility] [address] at [phone].

## 2022-04-04 RX ORDER — FLUOXETINE HYDROCHLORIDE 40 MG/1
40 CAPSULE ORAL DAILY
Qty: 90 CAPSULE | Refills: 1 | Status: SHIPPED | OUTPATIENT
Start: 2022-04-04

## 2022-04-04 NOTE — TELEPHONE ENCOUNTER
Refill passed per [facility] protocol   Requested Prescriptions   Pending Prescriptions Disp Refills    FLUOXETINE HCL 40 MG Oral Cap [Pharmacy Med Name: FLUOXETINE HCL 40 MG CAPSULE] 90 capsule 0     Sig: TAKE 1 CAPSULE BY MOUTH EVERY DAY        Psychiatric Non-Scheduled (Anti-Anxiety) Passed - 4/3/2022 10:08 PM        Passed - Appointment in last 6 or next 3 months

## 2022-04-06 RX ORDER — PHENTERMINE AND TOPIRAMATE 7.5; 46 MG/1; MG/1
1 CAPSULE, EXTENDED RELEASE ORAL DAILY
Qty: 30 CAPSULE | Refills: 2 | Status: SHIPPED | OUTPATIENT
Start: 2022-04-06

## 2022-04-26 ENCOUNTER — TELEPHONE (OUTPATIENT)
Dept: FAMILY MEDICINE CLINIC | Facility: CLINIC | Age: 42
End: 2022-04-26

## 2022-04-26 ENCOUNTER — PATIENT MESSAGE (OUTPATIENT)
Dept: FAMILY MEDICINE CLINIC | Facility: CLINIC | Age: 42
End: 2022-04-26

## 2022-04-26 NOTE — TELEPHONE ENCOUNTER
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  Pt stated that it was denied and she would like to know if ALS filled it out or someone else

## 2022-04-26 NOTE — TELEPHONE ENCOUNTER
From: Catrachita Cuadra  To: Esvin Gardner DO  Sent: 4/26/2022 10:48 AM CDT  Subject: Please disregard my call in today    I have found the document I was calling about in my chart. Thank you!

## 2022-05-31 RX ORDER — NORETHINDRONE ACETATE AND ETHINYL ESTRADIOL 1.5-30(21)
1 KIT ORAL DAILY
Qty: 84 TABLET | Refills: 3 | Status: SHIPPED | OUTPATIENT
Start: 2022-05-31 | End: 2022-10-18

## 2022-06-02 ENCOUNTER — HOSPITAL ENCOUNTER (OUTPATIENT)
Dept: MAMMOGRAPHY | Age: 42
Discharge: HOME | End: 2022-06-02
Attending: OBSTETRICS & GYNECOLOGY
Payer: COMMERCIAL

## 2022-06-02 DIAGNOSIS — Z12.31 BREAST CANCER SCREENING BY MAMMOGRAM: ICD-10-CM

## 2022-06-02 PROCEDURE — 77063 BREAST TOMOSYNTHESIS BI: CPT | Performed by: OBSTETRICS & GYNECOLOGY

## 2022-06-02 PROCEDURE — 77067 SCR MAMMO BI INCL CAD: CPT | Performed by: OBSTETRICS & GYNECOLOGY

## 2022-10-07 RX ORDER — FLUOXETINE HYDROCHLORIDE 60 MG/1
1 TABLET, FILM COATED ORAL; ORAL DAILY
Qty: 90 TABLET | Refills: 1 | Status: SHIPPED | OUTPATIENT
Start: 2022-10-07

## 2022-10-07 NOTE — TELEPHONE ENCOUNTER
Refill passed per [facility] protocol. Requested Prescriptions   Pending Prescriptions Disp Refills    FLUOXETINE HCL 60 MG Oral Tab [Pharmacy Med Name: FLUOXETINE HCL 60 MG TABLET] 90 tablet 0     Sig: TAKE 1 TABLET BY MOUTH EVERY DAY        Psychiatric Non-Scheduled (Anti-Anxiety) Passed - 10/7/2022  2:13 AM        Passed - In person appointment or virtual visit in the past 6 mos or appointment in next 3 mos       Recent Outpatient Visits              2 months ago [address] , [facility]    7 months ago Encounter for therapeutic drug monitoring    [address] [facility], [place]    Office Visit    8 months ago Encounter for therapeutic drug monitoring    [address] [facility], [place]    Office Visit    8 months ago [employer] annual routine gynecological examination    [address], Kelly Shone, MD    Office Visit    9 months ago [address])    Nurse Only     Future Appointments         Provider Department Appt Notes    In 1 week Avery Rojas, [address] Foot pain, left foot, possible neuroma/pinched nerves?                    Recent Outpatient Visits              2 months ago [address] , [facility] Avery Rojas [place]    Telemedicine    7 months ago Encounter for therapeutic drug monitoring    [address] [facility], Jonathan 2, APRN    Office Visit    8 months ago Encounter for therapeutic drug monitoring    [address] [facility]    Office Visit    8 months ago [employer] annual routine gynecological examination    [address] Eileen Flowers MD    Office Visit    9 months ago COVID-19    [facility])    Nurse Only          Future Appointments         Provider Department Appt Notes    In 1 week Arlene Phlegm, [facility] Foot pain, left foot, possible neuroma/pinched nerves?

## 2022-10-10 ENCOUNTER — PATIENT MESSAGE (OUTPATIENT)
Dept: FAMILY MEDICINE CLINIC | Facility: CLINIC | Age: 42
End: 2022-10-10

## 2022-10-10 DIAGNOSIS — M79.672 LEFT FOOT PAIN: Primary | ICD-10-CM

## 2022-10-18 ENCOUNTER — OFFICE VISIT (OUTPATIENT)
Dept: FAMILY MEDICINE CLINIC | Facility: CLINIC | Age: 42
End: 2022-10-18
Payer: COMMERCIAL

## 2022-10-18 ENCOUNTER — LAB ENCOUNTER (OUTPATIENT)
Dept: LAB | Facility: REFERENCE LAB | Age: 42
End: 2022-10-18
Attending: FAMILY MEDICINE
Payer: COMMERCIAL

## 2022-10-18 VITALS
BODY MASS INDEX: 37.65 KG/M2 | OXYGEN SATURATION: 98 % | SYSTOLIC BLOOD PRESSURE: 124 MMHG | HEIGHT: 61.42 IN | DIASTOLIC BLOOD PRESSURE: 82 MMHG | HEART RATE: 70 BPM | WEIGHT: 202 LBS

## 2022-10-18 DIAGNOSIS — Z00.00 ENCOUNTER FOR ROUTINE ADULT HEALTH EXAMINATION WITHOUT ABNORMAL FINDINGS: Primary | ICD-10-CM

## 2022-10-18 DIAGNOSIS — Z23 NEED FOR VACCINATION: ICD-10-CM

## 2022-10-18 DIAGNOSIS — Z00.00 ENCOUNTER FOR ROUTINE ADULT HEALTH EXAMINATION WITHOUT ABNORMAL FINDINGS: ICD-10-CM

## 2022-10-18 DIAGNOSIS — F41.9 ANXIETY: ICD-10-CM

## 2022-10-18 PROBLEM — R03.0 ELEVATED BLOOD PRESSURE READING: Status: RESOLVED | Noted: 2021-12-09 | Resolved: 2022-10-18

## 2022-10-18 PROBLEM — Z30.41 ENCOUNTER FOR SURVEILLANCE OF CONTRACEPTIVE PILLS: Status: RESOLVED | Noted: 2019-02-14 | Resolved: 2022-10-18

## 2022-10-18 LAB
ALBUMIN SERPL-MCNC: 3.6 G/DL (ref 3.4–5)
ALBUMIN/GLOB SERPL: 0.9 {RATIO} (ref 1–2)
ALP LIVER SERPL-CCNC: 116 U/L
ALT SERPL-CCNC: 26 U/L
ANION GAP SERPL CALC-SCNC: 6 MMOL/L (ref 0–18)
AST SERPL-CCNC: 16 U/L (ref 15–37)
BASOPHILS # BLD AUTO: 0.02 X10(3) UL (ref 0–0.2)
BASOPHILS NFR BLD AUTO: 0.4 %
BILIRUB SERPL-MCNC: 0.8 MG/DL (ref 0.1–2)
BUN BLD-MCNC: 13 MG/DL (ref 7–18)
BUN/CREAT SERPL: 19.4 (ref 10–20)
CALCIUM BLD-MCNC: 8.8 MG/DL (ref 8.5–10.1)
CHLORIDE SERPL-SCNC: 106 MMOL/L (ref 98–112)
CHOLEST SERPL-MCNC: 169 MG/DL (ref ?–200)
CO2 SERPL-SCNC: 27 MMOL/L (ref 21–32)
CREAT BLD-MCNC: 0.67 MG/DL
DEPRECATED RDW RBC AUTO: 42.7 FL (ref 35.1–46.3)
EOSINOPHIL # BLD AUTO: 0.48 X10(3) UL (ref 0–0.7)
EOSINOPHIL NFR BLD AUTO: 8.7 %
ERYTHROCYTE [DISTWIDTH] IN BLOOD BY AUTOMATED COUNT: 12.5 % (ref 11–15)
EST. AVERAGE GLUCOSE BLD GHB EST-MCNC: 91 MG/DL (ref 68–126)
FASTING PATIENT LIPID ANSWER: YES
FASTING STATUS PATIENT QL REPORTED: YES
GFR SERPLBLD BASED ON 1.73 SQ M-ARVRAT: 113 ML/MIN/1.73M2 (ref 60–?)
GLOBULIN PLAS-MCNC: 4 G/DL (ref 2.8–4.4)
GLUCOSE BLD-MCNC: 91 MG/DL (ref 70–99)
HBA1C MFR BLD: 4.8 % (ref ?–5.7)
HCT VFR BLD AUTO: 38.5 %
HDLC SERPL-MCNC: 66 MG/DL (ref 40–59)
HGB BLD-MCNC: 12.5 G/DL
IMM GRANULOCYTES # BLD AUTO: 0.02 X10(3) UL (ref 0–1)
IMM GRANULOCYTES NFR BLD: 0.4 %
LDLC SERPL CALC-MCNC: 88 MG/DL (ref ?–100)
LYMPHOCYTES # BLD AUTO: 1.61 X10(3) UL (ref 1–4)
LYMPHOCYTES NFR BLD AUTO: 29.2 %
MCH RBC QN AUTO: 30 PG (ref 26–34)
MCHC RBC AUTO-ENTMCNC: 32.5 G/DL (ref 31–37)
MCV RBC AUTO: 92.5 FL
MONOCYTES # BLD AUTO: 0.42 X10(3) UL (ref 0.1–1)
MONOCYTES NFR BLD AUTO: 7.6 %
NEUTROPHILS # BLD AUTO: 2.97 X10 (3) UL (ref 1.5–7.7)
NEUTROPHILS # BLD AUTO: 2.97 X10(3) UL (ref 1.5–7.7)
NEUTROPHILS NFR BLD AUTO: 53.7 %
NONHDLC SERPL-MCNC: 103 MG/DL (ref ?–130)
OSMOLALITY SERPL CALC.SUM OF ELEC: 288 MOSM/KG (ref 275–295)
PLATELET # BLD AUTO: 265 10(3)UL (ref 150–450)
POTASSIUM SERPL-SCNC: 4.1 MMOL/L (ref 3.5–5.1)
PROT SERPL-MCNC: 7.6 G/DL (ref 6.4–8.2)
RBC # BLD AUTO: 4.16 X10(6)UL
SODIUM SERPL-SCNC: 139 MMOL/L (ref 136–145)
TRIGL SERPL-MCNC: 79 MG/DL (ref 30–149)
VLDLC SERPL CALC-MCNC: 13 MG/DL (ref 0–30)
WBC # BLD AUTO: 5.5 X10(3) UL (ref 4–11)

## 2022-10-18 PROCEDURE — 90471 IMMUNIZATION ADMIN: CPT | Performed by: FAMILY MEDICINE

## 2022-10-18 PROCEDURE — 85025 COMPLETE CBC W/AUTO DIFF WBC: CPT

## 2022-10-18 PROCEDURE — 90686 IIV4 VACC NO PRSV 0.5 ML IM: CPT | Performed by: FAMILY MEDICINE

## 2022-10-18 PROCEDURE — 3074F SYST BP LT 130 MM HG: CPT | Performed by: FAMILY MEDICINE

## 2022-10-18 PROCEDURE — 80061 LIPID PANEL: CPT

## 2022-10-18 PROCEDURE — 3008F BODY MASS INDEX DOCD: CPT | Performed by: FAMILY MEDICINE

## 2022-10-18 PROCEDURE — 99396 PREV VISIT EST AGE 40-64: CPT | Performed by: FAMILY MEDICINE

## 2022-10-18 PROCEDURE — 83036 HEMOGLOBIN GLYCOSYLATED A1C: CPT

## 2022-10-18 PROCEDURE — 3079F DIAST BP 80-89 MM HG: CPT | Performed by: FAMILY MEDICINE

## 2022-10-18 PROCEDURE — 36415 COLL VENOUS BLD VENIPUNCTURE: CPT

## 2022-10-18 PROCEDURE — 80053 COMPREHEN METABOLIC PANEL: CPT

## 2022-11-18 ENCOUNTER — MED REC SCAN ONLY (OUTPATIENT)
Dept: FAMILY MEDICINE CLINIC | Facility: CLINIC | Age: 42
End: 2022-11-18

## 2023-04-14 RX ORDER — FLUOXETINE HYDROCHLORIDE 60 MG/1
1 TABLET, FILM COATED ORAL; ORAL DAILY
Qty: 90 TABLET | Refills: 3 | Status: SHIPPED | OUTPATIENT
Start: 2023-04-14

## 2023-04-14 NOTE — TELEPHONE ENCOUNTER
Refill passed per [place] [employer] protocol.   Requested Prescriptions   Pending Prescriptions Disp Refills    FLUOXETINE HCL 60 MG Oral Tab [Pharmacy Med Name: FLUOXETINE HCL 60 MG TABLET] 90 tablet 1     Sig: TAKE 1 TABLET BY MOUTH EVERY DAY       Psychiatric Non-Scheduled (Anti-Anxiety) Passed - 4/14/2023 12:41 AM        Passed - In person appointment or virtual visit in the past 6 mos or appointment in next 3 mos     Recent Outpatient Visits              5 months ago Encounter for routine adult health examination without abnormal findings    [address] [place], [place]    Office Visit    9 months ago [address] [place] [address], [address] [place] [address]    Telemedicine    1 year ago Encounter for therapeutic drug monitoring    [address], [address], APRN    Office Visit    1 year ago Encounter for therapeutic drug monitoring    [address], [address] APRKESHAV    Office Visit    1 year ago [employer] annual routine gynecological examination    [address], [address], [address] Flowers MD    Office Visit

## 2024-02-24 ENCOUNTER — PATIENT MESSAGE (OUTPATIENT)
Facility: CLINIC | Age: 44
End: 2024-02-24

## 2024-02-24 DIAGNOSIS — Z12.31 BREAST CANCER SCREENING BY MAMMOGRAM: Primary | ICD-10-CM

## 2024-02-25 NOTE — TELEPHONE ENCOUNTER
Signed order. Thank you for pending. Please inform patient    Janis Paul MD, 02/25/24, 12:18 PM

## 2024-02-25 NOTE — TELEPHONE ENCOUNTER
From: Shellie Jj  To: Holly Gatica  Sent: 2/24/2024 9:48 AM CST  Subject: Mammo order    Hello! I noticed that I was overdue for a mammogram so I have that schedule for Wednesday. Unfortunately there is not a current order in the system for this. Can I please have an updated order for a regular mammogram?    Thanks!    Shellie

## 2024-02-26 NOTE — TELEPHONE ENCOUNTER
From  Hilda Stout CMA To  Shellie Jj Sent and Delivered  2/25/2024  6:37 PM   Last Read in MyChart  2/25/2024  7:29 PM by Shellie Jj

## 2024-02-28 ENCOUNTER — HOSPITAL ENCOUNTER (OUTPATIENT)
Dept: MAMMOGRAPHY | Age: 44
Discharge: HOME | End: 2024-02-28
Attending: FAMILY MEDICINE
Payer: COMMERCIAL

## 2024-02-28 DIAGNOSIS — Z12.31 ENCOUNTER FOR SCREENING MAMMOGRAM FOR MALIGNANT NEOPLASM OF BREAST: ICD-10-CM

## 2024-02-28 DIAGNOSIS — Z12.31 BREAST CANCER SCREENING BY MAMMOGRAM: ICD-10-CM

## 2024-02-28 PROCEDURE — 77067 SCR MAMMO BI INCL CAD: CPT | Performed by: FAMILY MEDICINE

## 2024-02-28 PROCEDURE — 77063 BREAST TOMOSYNTHESIS BI: CPT | Performed by: FAMILY MEDICINE

## 2024-05-16 RX ORDER — FLUOXETINE HYDROCHLORIDE 60 MG/1
1 TABLET, FILM COATED ORAL; ORAL DAILY
Qty: 90 TABLET | Refills: 1 | Status: SHIPPED | OUTPATIENT
Start: 2024-05-16

## 2024-05-16 NOTE — TELEPHONE ENCOUNTER
Please review. Protocol Failed; No Protocol  No future appointments.   PlanStan message sent to patient to schedule an office visit with Provider and/or  Routed to Patient  for assistance with appointment.     Requested Prescriptions   Pending Prescriptions Disp Refills    FLUoxetine HCl 60 MG Oral Tab 90 tablet 3     Sig: Take 1 tablet by mouth daily.       Psychiatric Non-Scheduled (Anti-Anxiety) Failed - 5/14/2024  8:08 AM        Failed - In person appointment or virtual visit in the past 6 mos or appointment in next 3 mos     Recent Outpatient Visits              1 year ago Encounter for routine adult health examination without abnormal findings    [facility] Holly Gatica DO    Office Visit    1 year ago Anxiety    [facility] Holly Gatica DO    Telemedicine    2 years ago [facility] for therapeutic drug [facility]    Office Visit    2 years ago Encounter for therapeutic drug monitoring    [facility]    Office Visit    2 years ago Women's annual routine gynecological examination    [facility] - OB/GYN Gregoria Cortes MD    Office Visit                      Failed - Depression Screening completed within the past 12 months                 Recent Outpatient Visits              1 year ago Encounter for routine adult health examination without abnormal findings    [facility]    Office Visit    1 year ago Anxiety    [facility] Holly Gatica DO    Telemedicine    2 years ago Encounter for therapeutic drug monitoring    [facility]    Office Visit    2 years ago  Encounter for therapeutic drug monitoring    [facility], Simona Andrew Lisa, HAYLEY    Office Visit    2 years ago Women's annual routine gynecological examination    [facility], [facility] - OB/GYN Sebastian, Gregoria TREVINO MD    Office Visit

## 2024-08-23 ENCOUNTER — LAB ENCOUNTER (OUTPATIENT)
Dept: LAB | Facility: REFERENCE LAB | Age: 44
End: 2024-08-23
Attending: FAMILY MEDICINE
Payer: COMMERCIAL

## 2024-08-23 ENCOUNTER — OFFICE VISIT (OUTPATIENT)
Facility: CLINIC | Age: 44
End: 2024-08-23
Payer: COMMERCIAL

## 2024-08-23 VITALS
BODY MASS INDEX: 39.51 KG/M2 | DIASTOLIC BLOOD PRESSURE: 84 MMHG | HEIGHT: 61.42 IN | WEIGHT: 212 LBS | SYSTOLIC BLOOD PRESSURE: 122 MMHG | OXYGEN SATURATION: 97 % | HEART RATE: 78 BPM

## 2024-08-23 DIAGNOSIS — Z01.818 PREOP EXAMINATION: ICD-10-CM

## 2024-08-23 DIAGNOSIS — Z00.00 ENCOUNTER FOR ROUTINE ADULT HEALTH EXAMINATION WITHOUT ABNORMAL FINDINGS: ICD-10-CM

## 2024-08-23 DIAGNOSIS — Z00.00 ENCOUNTER FOR ROUTINE ADULT HEALTH EXAMINATION WITHOUT ABNORMAL FINDINGS: Primary | ICD-10-CM

## 2024-08-23 DIAGNOSIS — Z12.4 PAP SMEAR FOR CERVICAL CANCER SCREENING: ICD-10-CM

## 2024-08-23 LAB
ALBUMIN SERPL-MCNC: 4.5 G/DL (ref 3.2–4.8)
ALBUMIN/GLOB SERPL: 1.4 {RATIO} (ref 1–2)
ALP LIVER SERPL-CCNC: 104 U/L
ALT SERPL-CCNC: 12 U/L
ANION GAP SERPL CALC-SCNC: 6 MMOL/L (ref 0–18)
AST SERPL-CCNC: 15 U/L (ref ?–34)
B-HCG SERPL-ACNC: <2.6 MIU/ML
BASOPHILS # BLD AUTO: 0.04 X10(3) UL (ref 0–0.2)
BASOPHILS NFR BLD AUTO: 0.5 %
BILIRUB SERPL-MCNC: 1.1 MG/DL (ref 0.3–1.2)
BUN BLD-MCNC: 13 MG/DL (ref 9–23)
BUN/CREAT SERPL: 19.4 (ref 10–20)
CALCIUM BLD-MCNC: 9.2 MG/DL (ref 8.7–10.4)
CHLORIDE SERPL-SCNC: 107 MMOL/L (ref 98–112)
CHOLEST SERPL-MCNC: 211 MG/DL (ref ?–200)
CO2 SERPL-SCNC: 26 MMOL/L (ref 21–32)
CREAT BLD-MCNC: 0.67 MG/DL
DEPRECATED RDW RBC AUTO: 40.6 FL (ref 35.1–46.3)
EGFRCR SERPLBLD CKD-EPI 2021: 111 ML/MIN/1.73M2 (ref 60–?)
EOSINOPHIL # BLD AUTO: 0.46 X10(3) UL (ref 0–0.7)
EOSINOPHIL NFR BLD AUTO: 5.4 %
ERYTHROCYTE [DISTWIDTH] IN BLOOD BY AUTOMATED COUNT: 12.7 % (ref 11–15)
EST. AVERAGE GLUCOSE BLD GHB EST-MCNC: 105 MG/DL (ref 68–126)
FASTING PATIENT LIPID ANSWER: YES
FASTING STATUS PATIENT QL REPORTED: YES
GLOBULIN PLAS-MCNC: 3.2 G/DL (ref 2–3.5)
GLUCOSE BLD-MCNC: 82 MG/DL (ref 70–99)
HBA1C MFR BLD: 5.3 % (ref ?–5.7)
HCT VFR BLD AUTO: 40 %
HDLC SERPL-MCNC: 64 MG/DL (ref 40–59)
HGB BLD-MCNC: 13.5 G/DL
IMM GRANULOCYTES # BLD AUTO: 0.03 X10(3) UL (ref 0–1)
IMM GRANULOCYTES NFR BLD: 0.4 %
LDLC SERPL CALC-MCNC: 129 MG/DL (ref ?–100)
LYMPHOCYTES # BLD AUTO: 1.92 X10(3) UL (ref 1–4)
LYMPHOCYTES NFR BLD AUTO: 22.7 %
MCH RBC QN AUTO: 29.5 PG (ref 26–34)
MCHC RBC AUTO-ENTMCNC: 33.8 G/DL (ref 31–37)
MCV RBC AUTO: 87.5 FL
MONOCYTES # BLD AUTO: 0.5 X10(3) UL (ref 0.1–1)
MONOCYTES NFR BLD AUTO: 5.9 %
NEUTROPHILS # BLD AUTO: 5.51 X10 (3) UL (ref 1.5–7.7)
NEUTROPHILS # BLD AUTO: 5.51 X10(3) UL (ref 1.5–7.7)
NEUTROPHILS NFR BLD AUTO: 65.1 %
NONHDLC SERPL-MCNC: 147 MG/DL (ref ?–130)
OSMOLALITY SERPL CALC.SUM OF ELEC: 287 MOSM/KG (ref 275–295)
PLATELET # BLD AUTO: 245 10(3)UL (ref 150–450)
POTASSIUM SERPL-SCNC: 3.9 MMOL/L (ref 3.5–5.1)
PROT SERPL-MCNC: 7.7 G/DL (ref 5.7–8.2)
RBC # BLD AUTO: 4.57 X10(6)UL
SODIUM SERPL-SCNC: 139 MMOL/L (ref 136–145)
TRIGL SERPL-MCNC: 101 MG/DL (ref 30–149)
VLDLC SERPL CALC-MCNC: 18 MG/DL (ref 0–30)
WBC # BLD AUTO: 8.5 X10(3) UL (ref 4–11)

## 2024-08-23 PROCEDURE — 84702 CHORIONIC GONADOTROPIN TEST: CPT | Performed by: FAMILY MEDICINE

## 2024-08-23 PROCEDURE — 80061 LIPID PANEL: CPT | Performed by: FAMILY MEDICINE

## 2024-08-23 PROCEDURE — 87624 HPV HI-RISK TYP POOLED RSLT: CPT | Performed by: FAMILY MEDICINE

## 2024-08-23 PROCEDURE — 80053 COMPREHEN METABOLIC PANEL: CPT | Performed by: FAMILY MEDICINE

## 2024-08-23 PROCEDURE — 85025 COMPLETE CBC W/AUTO DIFF WBC: CPT | Performed by: FAMILY MEDICINE

## 2024-08-23 PROCEDURE — 83036 HEMOGLOBIN GLYCOSYLATED A1C: CPT | Performed by: FAMILY MEDICINE

## 2024-08-23 NOTE — PROGRESS NOTES
HPI:   Shellie Jj is a 44 year old female who presents for a complete physical exam.     Having surgery on her left foot at [facility] on September 13th for torn ligaments and a ruptured plantar plate.   Still going to therapy regularly, and taking Fluoxetine 60 mg daily.   She has never had general anesthesia in the past, but has done well with twilight anesthesia in the past without complications. Also without a family history of malignant hyperthermia.     Last pap: 8/2020 and normal   Last mammogram: 2/2024 and normal    Menses: Regular, monthly cycles but heavy  Contraception:  Condoms   Previous colonoscopy: Never had one before    History of STD's: None  History of intimate partner violence: None  Family hx of breast, ovarian, cervical or colon CA: None  Diet and exercise: Active with her children, and tries to ride her bike. Cooks a lot, but not always healthy foods. Will have a lot of ribs, chicken, turkey, and brats. Also eats pasta, but makes it from scratch. Does try to eat fruits and vegetables. Eating more salads as well. Drinks primarily water. Will have a pop occasionally, but not regularly.   Immunizations:  Tdap: 2015, Covid: Completed 3 doses    REVIEW OF SYSTEMS:   GENERAL: feels well otherwise   SKIN: increased sweating, pimples in the armpits   EYES: no vision problems  BREAST: no lumps or masses, no nipple discharge   LUNGS: denies shortness of breath  CARDIOVASCULAR: denies chest pain  GI: denies abdominal pain,  No constipation or diarrhea, no hematochezia  : denies dysuria, vaginal discharge or itching  NEURO: denies headaches  PSYCHE: denies depression and stable anxiety          Current Outpatient Medications   Medication Sig Dispense Refill    FLUoxetine HCl 60 MG Oral Tab Take 1 tablet by mouth daily. 90 tablet 1    Multiple Vitamins-Iron (QC DAILY MULTIVITAMINS/IRON) Oral Tab Take by mouth.       Allergies   Allergen Reactions    Nsaids HIVES      Past Medical History:     Abnormal Pap smear of cervix    in early      Allergic rhinitis    Anxiety    Breast lump or mass    left breas mass    Depression    Migraines    Obesity      Past Surgical History:   Procedure Laterality Date      05/10/2013      2015    D & c  2007    SPAB     Foot surgery Bilateral 2012    x 2     Leep      in early  for abnormal pap     Other surgical history        Family History   Problem Relation Age of Onset    Heart Disorder Mother     Psoriasis Mother     Prostate Cancer Father 67    Other (Carpal tunnel syndrome) Father     Cancer Father     Pancreatic Cancer Maternal Grandmother 50    Cancer Maternal Grandmother         Pancreatic    No Known Problems Maternal Grandfather     Heart Disorder Paternal Grandmother     Dementia Paternal Grandmother     Prostate Cancer Paternal Grandfather     Heart Disorder Paternal Grandfather     Cancer Paternal Grandfather     Prostate Cancer Paternal Uncle 65      Social History:   Social History     Socioeconomic History    Marital status:    Tobacco Use    Smoking status: Former     Current packs/day: 0.50     Types: Cigarettes    Smokeless tobacco: Never    Tobacco comments:     quit in    Vaping Use    Vaping status: Never Used   Substance and Sexual Activity    Alcohol use: Yes     Alcohol/week: 9.0 standard drinks of alcohol     Types: 9 Standard drinks or equivalent per week     Comment: occasional     Drug use: Yes     Types: Cannabis     Comment: Occasionally    Sexual activity: Yes     Partners: Male     Birth control/protection: Condom   Other Topics Concern    Caffeine Concern No    Exercise No    Seat Belt No    Special Diet No    Stress Concern No    Weight Concern Yes   Social History Narrative    Lives with 2 sons and     No abuse     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 : Yes. Children: 2 sons (4th and 6th grade)         EXAM:     Wt Readings from Last 6  Encounters:   08/23/24 212 lb (96.2 kg)   10/18/22 202 lb (91.6 kg)   03/09/22 183 lb 8 oz (83.2 kg)   01/26/22 180 lb 8 oz (81.9 kg)   01/25/22 180 lb 11.2 oz (82 kg)   12/09/21 184 lb 12.8 oz (83.8 kg)     Body mass index is 39.51 kg/m².   /84   Pulse 78   Ht 5' 1.42\" (1.56 m)   Wt 212 lb (96.2 kg)   LMP 01/30/2024 (Approximate)   SpO2 97%   BMI 39.51 kg/m²     GENERAL: well developed, well nourished, in no apparent distress   SKIN: no rashes, no suspicious lesions  HEENT: atraumatic, normocephalic, throat clear; normal dentition  EYES: PERRLA, EOMI, conjunctiva are clear  NECK: supple, no adenopathy or thyroid masses   BREAST: no dominant or suspicious mass, no nipple discharge  LUNGS: clear to auscultation  CARDIO: RRR without murmur  GI: good bowel sounds, no masses, HSM or tenderness  : introitus is normal, scant discharge, cervix is pink, no adnexal or uterine masses or tenderness   EXTREMITIES: no edema    Cholesterol, Total (mg/dL)   Date Value   10/18/2022 169   10/13/2021 174   11/07/2019 127     HDL Cholesterol (mg/dL)   Date Value   10/18/2022 66 (H)   10/13/2021 59   11/07/2019 40     LDL Cholesterol (mg/dL)   Date Value   10/18/2022 88   10/13/2021 101 (H)   11/07/2019 68      ASSESSMENT AND PLAN:   Shellie Jj is a 44 year old female who presents for a complete physical exam.  Encounter Diagnoses   Name Primary?    Encounter for routine adult health examination without abnormal findings Yes    Preop examination     Pap smear for cervical cancer screening      Orders Placed This Encounter   Procedures    CBC With Differential With Platelet    Comp Metabolic Panel (14)    Hemoglobin A1C    Lipid Panel    Hpv High Risk , Thin Prep Collect    HCG, Beta Subunit, Quant [E]    ThinPrep PAP Smear B     Patient is low risk for intermediate risk foot surgery and medically cleared for surgery. Hold all vitamins, supplements, and Aspirin for one week prior to surgery.     Discussed with  patient the following:  -Monthly breast self-exam  -Breast cancer screening/mammograms and clinical breast exams  -Cervical cancer screening/pap smears  -Colon cancer screening/colonoscopy  -Adequate calcium and Vitamin D intake to prevent osteoporosis  -Healthy diet including adequate intake of vegetables and fruits, appropriate portion sizes, minimizing highly concentrated carbohydrate foods  -Exercising 30 minutes a day most days of the week   -Diabetes screening which she desires  -Cholesterol screening which she desires  -Recommendation for yearly influenza vaccine  -Need for Tdap once as an adult and Td booster every 10 years  -Need for Zoster vaccine for patients >= 50  -Contraception: Condoms  -STI screening (GC/Chlamydia/HIV): Not indicated  -Hepatitis C screening for all adults between the ages of 18 and 79: Checked and negative     All questions were answered during the visit and the patient verbalizes understanding. She will return in one year for next WWE or sooner as needed.    Meds & Refills for this Visit:  Requested Prescriptions      No prescriptions requested or ordered in this encounter       Imaging & Consults:  None    Holly Gatica DO  8/23/2024  11:35 AM

## 2024-08-26 LAB — HPV E6+E7 MRNA CVX QL NAA+PROBE: NEGATIVE

## 2024-08-29 LAB
LAST PAP RESULT: NORMAL
PAP HISTORY (OTHER THAN LAST PAP): NORMAL

## 2024-11-17 RX ORDER — FLUOXETINE HYDROCHLORIDE 60 MG/1
1 TABLET, FILM COATED ORAL; ORAL DAILY
Qty: 90 TABLET | Refills: 3 | Status: SHIPPED | OUTPATIENT
Start: 2024-11-17

## 2024-11-17 NOTE — TELEPHONE ENCOUNTER
Refill passed per [facility] protocol.    Requested Prescriptions   Pending Prescriptions Disp Refills    FLUOXETINE HCL 60 MG Oral Tab [Pharmacy Med Name: FLUOXETINE HCL 60 MG TABLET] 90 tablet 1     Sig: TAKE 1 TABLET BY MOUTH EVERY DAY       Psychiatric Non-Scheduled (Anti-Anxiety) Passed - 11/17/2024  8:44 AM        Passed - In person appointment or virtual visit in the past 6 mos or appointment in next 3 mos     Recent Outpatient Visits              2 months ago Encounter for routine adult health examination without abnormal findings    [facility] [place] Alison,     Office Visit    2 years ago Encounter for routine adult health examination without abnormal findings    [facility] [place],     Office Visit    2 years ago Anxiety    [facility] [place], DO    Telemedicine    2 years ago Encounter for therapeutic drug monitoring    [facility]    Office Visit    2 years ago Encounter for therapeutic drug monitoring    [facility]    Office Visit                      Passed - Depression Screening completed within the past 12 months                 Recent Outpatient Visits              2 months ago Encounter for routine adult health examination without abnormal findings    [facility]     Office Visit    2 years ago Encounter for routine adult health examination without abnormal findings    [facility],     Office Visit    2 years ago Anxiety    [facility] [place], DO    Telemedicine    2 years ago Encounter for therapeutic drug monitoring    [facility], [place],  Rose Maier APRN    Office Visit    2 years ago Encounter for therapeutic drug monitoring    [facility], [address], Rose Maier APRN    Office Visit

## 2025-07-31 ENCOUNTER — PATIENT MESSAGE (OUTPATIENT)
Facility: CLINIC | Age: 45
End: 2025-07-31

## 2025-08-11 ENCOUNTER — OFFICE VISIT (OUTPATIENT)
Dept: OBGYN CLINIC | Facility: CLINIC | Age: 45
End: 2025-08-11

## 2025-08-11 VITALS — SYSTOLIC BLOOD PRESSURE: 134 MMHG | BODY MASS INDEX: 41 KG/M2 | WEIGHT: 220 LBS | DIASTOLIC BLOOD PRESSURE: 86 MMHG

## 2025-08-11 DIAGNOSIS — Z01.419 WOMEN'S ANNUAL ROUTINE GYNECOLOGICAL EXAMINATION: Primary | ICD-10-CM

## 2025-08-11 DIAGNOSIS — N92.6 MENSTRUAL CHANGES: ICD-10-CM

## 2025-08-11 DIAGNOSIS — R53.83 OTHER FATIGUE: ICD-10-CM

## 2025-08-11 PROCEDURE — 3075F SYST BP GE 130 - 139MM HG: CPT | Performed by: STUDENT IN AN ORGANIZED HEALTH CARE EDUCATION/TRAINING PROGRAM

## 2025-08-11 PROCEDURE — 3079F DIAST BP 80-89 MM HG: CPT | Performed by: STUDENT IN AN ORGANIZED HEALTH CARE EDUCATION/TRAINING PROGRAM

## 2025-08-11 PROCEDURE — 99203 OFFICE O/P NEW LOW 30 MIN: CPT | Performed by: STUDENT IN AN ORGANIZED HEALTH CARE EDUCATION/TRAINING PROGRAM

## 2025-08-26 ENCOUNTER — HOSPITAL ENCOUNTER (OUTPATIENT)
Dept: MAMMOGRAPHY | Age: 45
Discharge: HOME | End: 2025-08-26
Attending: STUDENT IN AN ORGANIZED HEALTH CARE EDUCATION/TRAINING PROGRAM

## 2025-08-26 DIAGNOSIS — Z01.419 WOMEN'S ANNUAL ROUTINE GYNECOLOGICAL EXAMINATION: ICD-10-CM

## 2025-08-26 PROCEDURE — 77067 SCR MAMMO BI INCL CAD: CPT | Performed by: STUDENT IN AN ORGANIZED HEALTH CARE EDUCATION/TRAINING PROGRAM

## 2025-08-26 PROCEDURE — 77063 BREAST TOMOSYNTHESIS BI: CPT | Performed by: STUDENT IN AN ORGANIZED HEALTH CARE EDUCATION/TRAINING PROGRAM

## (undated) NOTE — MR AVS SNAPSHOT
After Visit Summary   8/26/2020    David Edwards    MRN: [id number]           Visit Information     Date & Time  8/26/2020  5:15 PM Provider  David Patel, [address] [facility], [facility] Dept.  Phone  3 Tips for making healthy food choices    Enjoy your food, but eat less. Fully enjoy your food when eating. Don’t eat while distracted and slow down. Avoid over sized portions. Don’t eat while when you’re bored.      EAT THESE FOODS MORE OFTEN: EAT THE [email or web address]                   DO YOU KNOW WHERE TO GO? Injury & Illness are never convenient. If you are dealing with a   non-emergency, consider your options before heading to an ER.   VIDEO VISITS  Visit face-to-face with visit [email or web address] or call [phone]. ([phone])

## (undated) NOTE — Clinical Note
Hello,  I met with Brenna Cowart in clinic today for weight loss/management. I have recommended intensive lifestyle/behavioral modifications for weight loss. In addition, I have recommended she continue diethlopropion and add in Contrave with [employer].  We will also hav

## (undated) NOTE — LETTER
Patient Name: Mesfin Mccray  : 1980  MRN: [id number]  Patient Address: [address] [place] [address] 2019 (COVID-19)     [facility] is committed to the safety and well-being of our patients, members, e carefully. If your symptoms get worse, call your healthcare provider immediately. 3. Get rest and stay hydrated.    4. If you have a medical appointment, call the healthcare provider ahead of time and tell them that you have or may have COVID-19.  5. For m of fever-reducing medications; and  · Improvement in respiratory symptoms (e.g., cough, shortness of breath); and  · At least 10 days have passed since symptoms first appeared OR if asymptomatic patient or date of symptom onset is unclear then use 10 days donors must:    · Have had a confirmed diagnosis of COVID-19  · Be symptom-free for at least 14 days*    *Some people will be required to have a repeat COVID-19 test in order to be eligible to donate.  If you’re instructed by Dimitris that a repeat test is r random. Researchers are trying to identify similarities between people with a Post-COVID condition to better understand if there are risk factors. How do I prevent a Post-COVID condition?   The best way to prevent the long-term symptoms of COVID-19 is